# Patient Record
Sex: FEMALE | Race: WHITE | NOT HISPANIC OR LATINO | Employment: FULL TIME
[De-identification: names, ages, dates, MRNs, and addresses within clinical notes are randomized per-mention and may not be internally consistent; named-entity substitution may affect disease eponyms.]

---

## 2021-11-24 ENCOUNTER — OFFICE VISIT (OUTPATIENT)
Dept: MEDICAL GROUP | Facility: OTHER | Age: 56
End: 2021-11-24
Payer: COMMERCIAL

## 2021-11-24 VITALS
DIASTOLIC BLOOD PRESSURE: 75 MMHG | HEART RATE: 77 BPM | WEIGHT: 200 LBS | OXYGEN SATURATION: 98 % | HEIGHT: 65 IN | SYSTOLIC BLOOD PRESSURE: 118 MMHG | BODY MASS INDEX: 33.32 KG/M2 | RESPIRATION RATE: 15 BRPM | TEMPERATURE: 97.2 F

## 2021-11-24 DIAGNOSIS — E03.8 OTHER SPECIFIED HYPOTHYROIDISM: ICD-10-CM

## 2021-11-24 DIAGNOSIS — E66.8 OTHER OBESITY: ICD-10-CM

## 2021-11-24 DIAGNOSIS — F90.9 ATTENTION DEFICIT DISORDER OF ADULT WITH HYPERACTIVITY: ICD-10-CM

## 2021-11-24 PROBLEM — E66.89 OTHER OBESITY: Status: ACTIVE | Noted: 2019-12-11

## 2021-11-24 PROBLEM — Z78.0 MENOPAUSE: Status: ACTIVE | Noted: 2021-11-24

## 2021-11-24 PROBLEM — C73 MALIGNANT TUMOR OF THYROID GLAND (HCC): Status: ACTIVE | Noted: 2021-11-24

## 2021-11-24 PROBLEM — E03.9 HYPOTHYROIDISM: Status: ACTIVE | Noted: 2019-12-11

## 2021-11-24 PROCEDURE — 99214 OFFICE O/P EST MOD 30 MIN: CPT | Performed by: FAMILY MEDICINE

## 2021-11-24 RX ORDER — DEXTROAMPHETAMINE SACCHARATE, AMPHETAMINE ASPARTATE, DEXTROAMPHETAMINE SULFATE AND AMPHETAMINE SULFATE 5; 5; 5; 5 MG/1; MG/1; MG/1; MG/1
20 TABLET ORAL
Qty: 30 EACH | Refills: 0 | Status: SHIPPED | OUTPATIENT
Start: 2022-01-23 | End: 2022-02-22

## 2021-11-24 RX ORDER — DEXTROAMPHETAMINE SACCHARATE, AMPHETAMINE ASPARTATE, DEXTROAMPHETAMINE SULFATE AND AMPHETAMINE SULFATE 2.5; 2.5; 2.5; 2.5 MG/1; MG/1; MG/1; MG/1
10 TABLET ORAL
Qty: 30 TABLET | Refills: 0 | Status: SHIPPED | OUTPATIENT
Start: 2021-12-24 | End: 2021-11-24 | Stop reason: SDUPTHER

## 2021-11-24 RX ORDER — DEXTROAMPHETAMINE SACCHARATE, AMPHETAMINE ASPARTATE, DEXTROAMPHETAMINE SULFATE AND AMPHETAMINE SULFATE 2.5; 2.5; 2.5; 2.5 MG/1; MG/1; MG/1; MG/1
10 TABLET ORAL
Qty: 30 TABLET | Refills: 0 | Status: SHIPPED | OUTPATIENT
Start: 2022-01-23 | End: 2022-02-22

## 2021-11-24 RX ORDER — DEXTROAMPHETAMINE SACCHARATE, AMPHETAMINE ASPARTATE, DEXTROAMPHETAMINE SULFATE AND AMPHETAMINE SULFATE 2.5; 2.5; 2.5; 2.5 MG/1; MG/1; MG/1; MG/1
10 TABLET ORAL
COMMUNITY
End: 2021-11-24 | Stop reason: SDUPTHER

## 2021-11-24 RX ORDER — LIOTHYRONINE SODIUM 5 UG/1
TABLET ORAL
COMMUNITY

## 2021-11-24 RX ORDER — DEXTROAMPHETAMINE SACCHARATE, AMPHETAMINE ASPARTATE, DEXTROAMPHETAMINE SULFATE AND AMPHETAMINE SULFATE 2.5; 2.5; 2.5; 2.5 MG/1; MG/1; MG/1; MG/1
10 TABLET ORAL
Qty: 30 TABLET | Refills: 0 | Status: SHIPPED | OUTPATIENT
Start: 2021-11-24 | End: 2021-11-24 | Stop reason: SDUPTHER

## 2021-11-24 RX ORDER — DEXTROAMPHETAMINE SACCHARATE, AMPHETAMINE ASPARTATE, DEXTROAMPHETAMINE SULFATE AND AMPHETAMINE SULFATE 5; 5; 5; 5 MG/1; MG/1; MG/1; MG/1
20 TABLET ORAL
COMMUNITY
End: 2021-11-24 | Stop reason: SDUPTHER

## 2021-11-24 RX ORDER — DEXTROAMPHETAMINE SACCHARATE, AMPHETAMINE ASPARTATE, DEXTROAMPHETAMINE SULFATE AND AMPHETAMINE SULFATE 5; 5; 5; 5 MG/1; MG/1; MG/1; MG/1
20 TABLET ORAL
Qty: 30 EACH | Refills: 0 | Status: SHIPPED | OUTPATIENT
Start: 2021-11-24 | End: 2021-11-24 | Stop reason: SDUPTHER

## 2021-11-24 RX ORDER — LEVOTHYROXINE SODIUM 0.12 MG/1
TABLET ORAL
COMMUNITY

## 2021-11-24 RX ORDER — DEXTROAMPHETAMINE SACCHARATE, AMPHETAMINE ASPARTATE, DEXTROAMPHETAMINE SULFATE AND AMPHETAMINE SULFATE 5; 5; 5; 5 MG/1; MG/1; MG/1; MG/1
20 TABLET ORAL
Qty: 30 EACH | Refills: 0 | Status: SHIPPED | OUTPATIENT
Start: 2021-12-24 | End: 2021-11-24 | Stop reason: SDUPTHER

## 2021-11-24 ASSESSMENT — ENCOUNTER SYMPTOMS: WEIGHT LOSS: 0

## 2021-11-24 ASSESSMENT — PATIENT HEALTH QUESTIONNAIRE - PHQ9: CLINICAL INTERPRETATION OF PHQ2 SCORE: 0

## 2021-11-24 NOTE — PROGRESS NOTES
Subjective:   Candy Cardona is a 56 y.o. female here for the evaluation and management of Follow-Up (medication)    Attention deficit disorder-stable with current medications, she is adjusting the afternoon dose for better efficacy but otherwise is doing well    Obesity-ongoing efforts at lifestyle changes and weight loss    Hypothyroidism-ongoing management by endocrinology    She reports she has pending referrals for GI and mammography and was encouraged to complete those  Problem   Malignant Tumor of Thyroid Gland (Hcc)   Menopause   Attention Deficit Disorder of Adult With Hyperactivity   Other Obesity   Hypothyroidism       Review of Systems   Constitutional: Negative for weight loss.       Current Outpatient Medications   Medication Sig Dispense Refill   • liothyronine (CYTOMEL) 5 MCG Tab 1 tab(s) am, 0.5 tab pm     • levothyroxine (LEVOXYL) 125 MCG Tab 1 tab 7 days and an extra 1/2 on sunday making it 7.5 tabs a week     • [START ON 1/23/2022] amphetamine-dextroamphetamine (ADDERALL) 10 MG Tab Take 1 Tablet by mouth every day for 30 days. 30 Tablet 0   • [START ON 1/23/2022] amphetamine-dextroamphetamine (ADDERALL) 20 MG Tab Take 1 Tablet by mouth every day for 30 days. 30 Each 0     No current facility-administered medications for this visit.     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   • Umbilical hernia 09/13/2012       Past Surgical History  Past Surgical History:   Procedure Laterality Date   • THYROIDECTOMY TOTAL  6/17/2015     "Procedure: THYROIDECTOMY TOTAL;  Surgeon: Keshav Chahal M.D.;  Location: SURGERY SAME DAY [facility];  Service:    • UMBILICAL HERNIA REPAIR  9/13/2012    Performed by MOO KEENAN at SURGERY SAME DAY [facility]   • GYN SURGERY  2009    uterine ablation   • OTHER  2007    breast augmentation   • GYN SURGERY  2007    tubal        Objective:     Vitals:    11/24/21 0812   BP: 118/75   BP Location: Left arm   Patient Position: Sitting   BP Cuff Size: Adult   Pulse: 77   Resp: 15   Temp: 36.2 °C (97.2 °F)   TempSrc: Temporal   SpO2: 98%   Weight: 90.7 kg (200 lb)   Height: 1.651 m (5' 5\")     Body mass index is 33.28 kg/m².     Physical Exam  Constitutional:       Appearance: Normal appearance.   HENT:      Head: Normocephalic.   Eyes:      Extraocular Movements: Extraocular movements intact.      Conjunctiva/sclera: Conjunctivae normal.   Cardiovascular:      Rate and Rhythm: Normal rate and regular rhythm.      Heart sounds: Normal heart sounds.   Pulmonary:      Effort: Pulmonary effort is normal.      Breath sounds: Normal breath sounds.   Skin:     General: Skin is warm and dry.   Neurological:      Mental Status: She is alert and oriented to person, place, and time. Mental status is at baseline.   Psychiatric:         Mood and Affect: Mood normal.         Behavior: Behavior normal.         Assessment and Plan:   Candy Cardona is a 56 y.o. female with a Follow-Up (medication)     The following was discussed with the patient today.    Problem List Items Addressed This Visit     Attention deficit disorder of adult with hyperactivity    Relevant Medications    amphetamine-dextroamphetamine (ADDERALL) 10 MG Tab (Start on 1/23/2022)    amphetamine-dextroamphetamine (ADDERALL) 20 MG Tab (Start on 1/23/2022)    Other obesity    Relevant Medications    amphetamine-dextroamphetamine (ADDERALL) 10 MG Tab (Start on 1/23/2022)    amphetamine-dextroamphetamine (ADDERALL) 20 MG Tab (Start on 1/23/2022)    " Hypothyroidism    Relevant Medications    liothyronine (CYTOMEL) 5 MCG Tab    levothyroxine (LEVOXYL) 125 MCG Tab        Medications reviewed, refills provided, 3-month supply was provided for Adderall.  Ongoing management of thyroid disease by endocrinology.  She will continue to pursue lifestyle changes for management of obesity.  She reports she is not my help but simply needs to call and schedule with gastroenterology and for her mammogram.  Follow-up in 3 months  Followup: Return in about 3 months (around 2/24/2022).    Frankie Osei M.D.    Please note that this dictation was created using voice recognition software. I have made every reasonable attempt to correct obvious errors, but I expect that there are errors of grammar and possibly content that I did not discover before finalizing the note.

## 2022-02-28 ENCOUNTER — OFFICE VISIT (OUTPATIENT)
Dept: MEDICAL GROUP | Facility: OTHER | Age: 57
End: 2022-02-28
Payer: COMMERCIAL

## 2022-02-28 VITALS
WEIGHT: 195 LBS | HEART RATE: 75 BPM | TEMPERATURE: 97.2 F | SYSTOLIC BLOOD PRESSURE: 115 MMHG | BODY MASS INDEX: 32.49 KG/M2 | RESPIRATION RATE: 14 BRPM | OXYGEN SATURATION: 95 % | DIASTOLIC BLOOD PRESSURE: 70 MMHG | HEIGHT: 65 IN

## 2022-02-28 DIAGNOSIS — E66.8 OTHER OBESITY: ICD-10-CM

## 2022-02-28 DIAGNOSIS — F90.9 ATTENTION DEFICIT DISORDER OF ADULT WITH HYPERACTIVITY: ICD-10-CM

## 2022-02-28 DIAGNOSIS — E03.8 OTHER SPECIFIED HYPOTHYROIDISM: ICD-10-CM

## 2022-02-28 PROCEDURE — 99214 OFFICE O/P EST MOD 30 MIN: CPT | Performed by: FAMILY MEDICINE

## 2022-02-28 RX ORDER — DEXTROAMPHETAMINE SACCHARATE, AMPHETAMINE ASPARTATE, DEXTROAMPHETAMINE SULFATE AND AMPHETAMINE SULFATE 5; 5; 5; 5 MG/1; MG/1; MG/1; MG/1
20 TABLET ORAL DAILY
Qty: 30 TABLET | Refills: 0 | Status: SHIPPED | OUTPATIENT
Start: 2022-03-14 | End: 2022-02-28 | Stop reason: SDUPTHER

## 2022-02-28 RX ORDER — DEXTROAMPHETAMINE SACCHARATE, AMPHETAMINE ASPARTATE, DEXTROAMPHETAMINE SULFATE AND AMPHETAMINE SULFATE 2.5; 2.5; 2.5; 2.5 MG/1; MG/1; MG/1; MG/1
10 TABLET ORAL DAILY
Qty: 30 TABLET | Refills: 0 | Status: SHIPPED | OUTPATIENT
Start: 2022-03-14 | End: 2022-02-28 | Stop reason: SDUPTHER

## 2022-02-28 RX ORDER — DEXTROAMPHETAMINE SACCHARATE, AMPHETAMINE ASPARTATE, DEXTROAMPHETAMINE SULFATE AND AMPHETAMINE SULFATE 5; 5; 5; 5 MG/1; MG/1; MG/1; MG/1
20 TABLET ORAL DAILY
Qty: 30 TABLET | Refills: 0 | Status: SHIPPED | OUTPATIENT
Start: 2022-04-14 | End: 2022-02-28 | Stop reason: SDUPTHER

## 2022-02-28 RX ORDER — DEXTROAMPHETAMINE SACCHARATE, AMPHETAMINE ASPARTATE, DEXTROAMPHETAMINE SULFATE AND AMPHETAMINE SULFATE 2.5; 2.5; 2.5; 2.5 MG/1; MG/1; MG/1; MG/1
10 TABLET ORAL DAILY
Qty: 30 TABLET | Refills: 0 | Status: SHIPPED | OUTPATIENT
Start: 2022-05-14 | End: 2022-06-14 | Stop reason: SDUPTHER

## 2022-02-28 RX ORDER — DEXTROAMPHETAMINE SACCHARATE, AMPHETAMINE ASPARTATE, DEXTROAMPHETAMINE SULFATE AND AMPHETAMINE SULFATE 2.5; 2.5; 2.5; 2.5 MG/1; MG/1; MG/1; MG/1
10 TABLET ORAL DAILY
Qty: 30 TABLET | Refills: 0 | Status: SHIPPED | OUTPATIENT
Start: 2022-04-14 | End: 2022-02-28 | Stop reason: SDUPTHER

## 2022-02-28 RX ORDER — DEXTROAMPHETAMINE SACCHARATE, AMPHETAMINE ASPARTATE, DEXTROAMPHETAMINE SULFATE AND AMPHETAMINE SULFATE 5; 5; 5; 5 MG/1; MG/1; MG/1; MG/1
20 TABLET ORAL DAILY
Qty: 30 TABLET | Refills: 0 | Status: SHIPPED | OUTPATIENT
Start: 2022-05-14 | End: 2022-06-14 | Stop reason: SDUPTHER

## 2022-02-28 ASSESSMENT — ENCOUNTER SYMPTOMS: CONSTITUTIONAL NEGATIVE: 1

## 2022-02-28 NOTE — PROGRESS NOTES
Subjective:   Candy Cardona is a 56 y.o. female here for the evaluation and management of Medication Refill    Attention deficit disorder-generally stable and well controlled on current medication, over the last 6 weeks he is noted some challenges with focus but thinks it may be secondary to personal stressors and potentially her thyroid disorder    Hypothyroidism-stable, follow-up with endocrinology planned this week    Obesity-discussed therapeutic lifestyle changes and she is adjusting her diet and feels like she is making progress  No problems updated.    Review of Systems   Constitutional: Negative.        Current Outpatient Medications   Medication Sig Dispense Refill   • [START ON 5/14/2022] amphetamine-dextroamphetamine (ADDERALL) 10 MG Tab Take 1 Tablet by mouth every day for 30 days. 30 Tablet 0   • [START ON 5/14/2022] amphetamine-dextroamphetamine (ADDERALL) 20 MG Tab Take 1 Tablet by mouth every day for 30 days. 30 Tablet 0   • liothyronine (CYTOMEL) 5 MCG Tab 1 tab(s) am, 0.5 tab pm     • levothyroxine (SYNTHROID) 125 MCG Tab 1 tab 7 days and an extra 1/2 on sunday making it 7.5 tabs a week       No current facility-administered medications for this visit.     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S   • GYN SURGERY  2009    uterine ablation   • OTHER  2007    breast augmentation   • GYN SURGERY  2007    tubal        Objective:     Vitals:    02/28/22 0811   BP: 115/70   BP Location: Left arm   Patient Position: Sitting   BP Cuff Size: Adult   Pulse: 75   Resp: 14   Temp: 36.2 °C (97.2 °F)   TempSrc: Temporal   SpO2: 95%   Weight: 88.5 kg (195 lb)   Height: 1.651 m (5' 5\")     Body mass index is 32.45 kg/m².     Physical Exam  Constitutional:       Appearance: Normal appearance.   HENT:      Head: Normocephalic.   Eyes:      Extraocular Movements: Extraocular movements intact.      Conjunctiva/sclera: Conjunctivae normal.   Cardiovascular:      Rate and Rhythm: Normal rate and regular rhythm.      Heart sounds: Normal heart sounds.   Pulmonary:      Effort: Pulmonary effort is normal.      Breath sounds: Normal breath sounds.   Musculoskeletal:      Cervical back: Neck supple.   Skin:     General: Skin is warm and dry.   Neurological:      Mental Status: She is alert and oriented to person, place, and time. Mental status is at baseline.   Psychiatric:         Mood and Affect: Mood normal.         Behavior: Behavior normal.         Assessment and Plan:   Candy Cardona is a 56 y.o. female with a Medication Refill     The following was discussed with the patient today.    Problem List Items Addressed This Visit     Attention deficit disorder of adult with hyperactivity    Relevant Medications    amphetamine-dextroamphetamine (ADDERALL) 10 MG Tab (Start on 5/14/2022)    amphetamine-dextroamphetamine (ADDERALL) 20 MG Tab (Start on 5/14/2022)    Other Relevant Orders    Controlled Substance Treatment Agreement    Other obesity    Relevant Medications    amphetamine-dextroamphetamine (ADDERALL) 10 MG Tab (Start on 5/14/2022)    amphetamine-dextroamphetamine " (ADDERALL) 20 MG Tab (Start on 5/14/2022)    Hypothyroidism        Medications reviewed and refills provided, 3-month supply provided on her Adderall, follow-up confirmed with endocrinology, patient is scheduled for colonoscopy for March 16, followed up on mammogram and she says she will schedule this, immunizations reviewed and she reports a plan to obtain her second shingles vaccination, follow-up with me in 3 months, prescription monitoring report reviewed and informed written consent obtained  Followup: Return in about 3 months (around 5/28/2022).    Frankie Osei M.D.    Please note that this dictation was created using voice recognition software. I have made every reasonable attempt to correct obvious errors, but I expect that there are errors of grammar and possibly content that I did not discover before finalizing the note.

## 2022-06-14 ENCOUNTER — OFFICE VISIT (OUTPATIENT)
Dept: MEDICAL GROUP | Facility: OTHER | Age: 57
End: 2022-06-14
Payer: COMMERCIAL

## 2022-06-14 VITALS
HEIGHT: 65 IN | WEIGHT: 199 LBS | BODY MASS INDEX: 33.15 KG/M2 | SYSTOLIC BLOOD PRESSURE: 114 MMHG | TEMPERATURE: 97 F | OXYGEN SATURATION: 94 % | RESPIRATION RATE: 16 BRPM | DIASTOLIC BLOOD PRESSURE: 80 MMHG | HEART RATE: 83 BPM

## 2022-06-14 DIAGNOSIS — E66.8 OTHER OBESITY: ICD-10-CM

## 2022-06-14 DIAGNOSIS — R06.83 SNORING: ICD-10-CM

## 2022-06-14 DIAGNOSIS — Z00.00 EXAMINATION, MEDICAL, GENERAL: ICD-10-CM

## 2022-06-14 DIAGNOSIS — E03.8 OTHER SPECIFIED HYPOTHYROIDISM: ICD-10-CM

## 2022-06-14 DIAGNOSIS — F90.9 ATTENTION DEFICIT DISORDER OF ADULT WITH HYPERACTIVITY: ICD-10-CM

## 2022-06-14 PROCEDURE — 99214 OFFICE O/P EST MOD 30 MIN: CPT | Performed by: FAMILY MEDICINE

## 2022-06-14 RX ORDER — DEXTROAMPHETAMINE SACCHARATE, AMPHETAMINE ASPARTATE, DEXTROAMPHETAMINE SULFATE AND AMPHETAMINE SULFATE 5; 5; 5; 5 MG/1; MG/1; MG/1; MG/1
20 TABLET ORAL DAILY
Qty: 90 TABLET | Refills: 0 | Status: SHIPPED | OUTPATIENT
Start: 2022-06-14 | End: 2022-09-12

## 2022-06-14 RX ORDER — DEXTROAMPHETAMINE SACCHARATE, AMPHETAMINE ASPARTATE, DEXTROAMPHETAMINE SULFATE AND AMPHETAMINE SULFATE 2.5; 2.5; 2.5; 2.5 MG/1; MG/1; MG/1; MG/1
10 TABLET ORAL DAILY
Qty: 90 TABLET | Refills: 0 | Status: SHIPPED | OUTPATIENT
Start: 2022-06-14 | End: 2022-09-12

## 2022-06-14 ASSESSMENT — PATIENT HEALTH QUESTIONNAIRE - PHQ9: CLINICAL INTERPRETATION OF PHQ2 SCORE: 0

## 2022-06-14 ASSESSMENT — ENCOUNTER SYMPTOMS: CONSTITUTIONAL NEGATIVE: 1

## 2022-06-14 NOTE — PROGRESS NOTES
Subjective:   Candy Cardona is a 56 y.o. 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  Uncertain if she is a heavy snorer as she does not currently have a sleep partner who can report.  No problems updated.    Review of Systems   Constitutional: Negative.        Current Outpatient Medications   Medication Sig Dispense Refill   • amphetamine-dextroamphetamine (ADDERALL) 10 MG Tab Take 1 Tablet by mouth every day for 90 days. 90 Tablet 0   • amphetamine-dextroamphetamine (ADDERALL) 20 MG Tab Take 1 Tablet by mouth every day for 90 days. 90 Tablet 0   • liothyronine (CYTOMEL) 5 MCG Tab 1 tab(s) am, 0.5 tab pm     • levothyroxine (SYNTHROID) 125 MCG Tab 1 tab 7 days and an extra 1/2 on sunday making it 7.5 tabs a week       No current facility-administered medications for this visit.     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   • Umbilical hernia 09/13/2012       Past Surgical History  Past Surgical History:   Procedure Laterality Date   •  "THYROIDECTOMY TOTAL  6/17/2015    Procedure: THYROIDECTOMY TOTAL;  Surgeon: Keshav Chahal M.D.;  Location: SURGERY SAME DAY [facility];  Service:    • UMBILICAL HERNIA REPAIR  9/13/2012    Performed by MOO KEENAN at SURGERY SAME DAY [facility] ORS   • GYN SURGERY  2009    uterine ablation   • OTHER  2007    breast augmentation   • GYN SURGERY  2007    tubal        Objective:     Vitals:    06/14/22 0803   BP: 114/80   BP Location: Right arm   Patient Position: Sitting   BP Cuff Size: Adult   Pulse: 83   Resp: 16   Temp: 36.1 °C (97 °F)   TempSrc: Temporal   SpO2: 94%   Weight: 90.3 kg (199 lb)   Height: 1.651 m (5' 5\")     Body mass index is 33.12 kg/m².     Physical Exam  Constitutional:       Appearance: Normal appearance.   HENT:      Head: Normocephalic.   Eyes:      Extraocular Movements: Extraocular movements intact.      Conjunctiva/sclera: Conjunctivae normal.   Cardiovascular:      Rate and Rhythm: Normal rate and regular rhythm.      Heart sounds: Normal heart sounds.   Pulmonary:      Effort: Pulmonary effort is normal.      Breath sounds: Normal breath sounds.   Skin:     General: Skin is warm and dry.   Neurological:      Mental Status: She is alert and oriented to person, place, and time. Mental status is at baseline.   Psychiatric:         Mood and Affect: Mood normal.         Behavior: Behavior normal.         Assessment and Plan:   Candy Cardona is a 56 y.o. female with a Medication Refill     The following was discussed with the patient today.    Problem List Items Addressed This Visit     Attention deficit disorder of adult with hyperactivity    Relevant Medications    amphetamine-dextroamphetamine (ADDERALL) 10 MG Tab    amphetamine-dextroamphetamine (ADDERALL) 20 MG Tab    Other Relevant Orders    CBC WITHOUT DIFFERENTIAL    HEMOGLOBIN A1C    Comp Metabolic Panel    Lipid Profile    Other obesity    Relevant Medications    amphetamine-dextroamphetamine (ADDERALL) 10 MG Tab    " amphetamine-dextroamphetamine (ADDERALL) 20 MG Tab    Other Relevant Orders    CBC WITHOUT DIFFERENTIAL    HEMOGLOBIN A1C    Comp Metabolic Panel    Lipid Profile    Hypothyroidism    Relevant Orders    CBC WITHOUT DIFFERENTIAL    HEMOGLOBIN A1C    Comp Metabolic Panel    Lipid Profile      Other Visit Diagnoses     Examination, medical, general        Relevant Orders    CBC WITHOUT DIFFERENTIAL    Comp Metabolic Panel    Lipid Profile        Medications reviewed and refills provided, reviewed prescription monitoring report and provided a 90-day supply for Adderall, she has been stable with long-term use of Adderall.  Routine laboratory studies recommended, referral for home sleep study, close follow-up in 3 months  Followup: No follow-ups on file.    Frankie Osei M.D.    Please note that this dictation was created using voice recognition software. I have made every reasonable attempt to correct obvious errors, but I expect that there are errors of grammar and possibly content that I did not discover before finalizing the note.

## 2022-06-17 DIAGNOSIS — Z00.00 EXAMINATION, MEDICAL, GENERAL: ICD-10-CM

## 2022-06-21 ENCOUNTER — TELEPHONE (OUTPATIENT)
Dept: MEDICAL GROUP | Facility: OTHER | Age: 57
End: 2022-06-21
Payer: COMMERCIAL

## 2022-06-21 DIAGNOSIS — Z00.00 EXAMINATION, MEDICAL, GENERAL: ICD-10-CM

## 2022-06-21 NOTE — TELEPHONE ENCOUNTER
Dr. Osei when you get a chance would you please add the follow labs to patient order for her upcoming blood draw.   A varicella titer, Hep b titer, tb titer. Please advise

## 2022-06-21 NOTE — TELEPHONE ENCOUNTER
Pt called she has a lab slip to get labs done. She needs  A varicella titer, Hep b titer, tb titer. She is having her labs done tomorrow. Please call pt if there are any questions [phone] thank you

## 2022-09-13 ENCOUNTER — OFFICE VISIT (OUTPATIENT)
Dept: MEDICAL GROUP | Facility: CLINIC | Age: 57
End: 2022-09-13
Payer: COMMERCIAL

## 2022-09-13 VITALS
HEIGHT: 65 IN | DIASTOLIC BLOOD PRESSURE: 80 MMHG | WEIGHT: 202 LBS | HEART RATE: 95 BPM | OXYGEN SATURATION: 95 % | SYSTOLIC BLOOD PRESSURE: 132 MMHG | BODY MASS INDEX: 33.66 KG/M2 | RESPIRATION RATE: 16 BRPM

## 2022-09-13 DIAGNOSIS — R06.83 SNORING: ICD-10-CM

## 2022-09-13 DIAGNOSIS — F90.9 ATTENTION DEFICIT DISORDER OF ADULT WITH HYPERACTIVITY: ICD-10-CM

## 2022-09-13 DIAGNOSIS — G47.33 OSA (OBSTRUCTIVE SLEEP APNEA): ICD-10-CM

## 2022-09-13 PROCEDURE — 99214 OFFICE O/P EST MOD 30 MIN: CPT | Mod: GC | Performed by: STUDENT IN AN ORGANIZED HEALTH CARE EDUCATION/TRAINING PROGRAM

## 2022-09-13 RX ORDER — DEXTROAMPHETAMINE SACCHARATE, AMPHETAMINE ASPARTATE, DEXTROAMPHETAMINE SULFATE AND AMPHETAMINE SULFATE 5; 5; 5; 5 MG/1; MG/1; MG/1; MG/1
20 TABLET ORAL 2 TIMES DAILY
Qty: 60 EACH | Refills: 0 | Status: SHIPPED | OUTPATIENT
Start: 2022-09-13 | End: 2022-10-13

## 2022-09-13 RX ORDER — DEXTROAMPHETAMINE SACCHARATE, AMPHETAMINE ASPARTATE, DEXTROAMPHETAMINE SULFATE AND AMPHETAMINE SULFATE 5; 5; 5; 5 MG/1; MG/1; MG/1; MG/1
20 TABLET ORAL 2 TIMES DAILY
Qty: 60 EACH | Refills: 0 | Status: SHIPPED | OUTPATIENT
Start: 2022-11-08 | End: 2022-12-08

## 2022-09-13 RX ORDER — DEXTROAMPHETAMINE SACCHARATE, AMPHETAMINE ASPARTATE, DEXTROAMPHETAMINE SULFATE AND AMPHETAMINE SULFATE 5; 5; 5; 5 MG/1; MG/1; MG/1; MG/1
20 TABLET ORAL DAILY
COMMUNITY
End: 2022-09-13

## 2022-09-13 RX ORDER — DEXTROAMPHETAMINE SACCHARATE, AMPHETAMINE ASPARTATE, DEXTROAMPHETAMINE SULFATE AND AMPHETAMINE SULFATE 2.5; 2.5; 2.5; 2.5 MG/1; MG/1; MG/1; MG/1
10 TABLET ORAL DAILY
COMMUNITY
End: 2022-09-13

## 2022-09-13 RX ORDER — DEXTROAMPHETAMINE SACCHARATE, AMPHETAMINE ASPARTATE, DEXTROAMPHETAMINE SULFATE AND AMPHETAMINE SULFATE 5; 5; 5; 5 MG/1; MG/1; MG/1; MG/1
20 TABLET ORAL 2 TIMES DAILY
Qty: 60 EACH | Refills: 0 | Status: SHIPPED | OUTPATIENT
Start: 2022-10-11 | End: 2022-11-10

## 2022-09-13 ASSESSMENT — FIBROSIS 4 INDEX: FIB4 SCORE: 0.73

## 2022-09-13 NOTE — PROGRESS NOTES
"Subjective:     CC: Follow up    HPI:   Candy presents today with     Problem   Snoring    As well as daytime somnolence.  Patient's mother suffers from sleep apnea, both obstructive and central.  Patient also snores.  She is concerned that she has sleep apnea.  She went to a specialist already, but had a very negative experience with this provider and is requesting a referral to another specific provider.     Attention Deficit Disorder of Adult With Hyperactivity    Patient here for refill on her Adderall.  She is currently only taking 20 mg at 6 AM and then 10 mg at 1:00 PM.  She states she is having no difficulty sleeping, no symptoms of agitation or irritability, no palpitations.  She is tolerating this regimen well, but did discuss with Dr. Osei historically about increasing the afternoon dose for better productivity.  She states that the afternoon her attention wanes and she could use some extra help there.  She does not take medication holidays at this time.          Current Outpatient Medications Ordered in Epic   Medication Sig Dispense Refill    amphetamine-dextroamphetamine (ADDERALL) 20 MG Tab Take 1 Tablet by mouth 2 times a day for 30 days. 20 mg AM and 20 mg PM 60 Each 0    [START ON 10/11/2022] amphetamine-dextroamphetamine (ADDERALL) 20 MG Tab Take 1 Tablet by mouth 2 times a day for 30 days. 60 Each 0    [START ON 11/8/2022] amphetamine-dextroamphetamine (ADDERALL) 20 MG Tab Take 1 Tablet by mouth 2 times a day for 30 days. 60 Each 0    liothyronine (CYTOMEL) 5 MCG Tab 1 tab(s) am, 0.5 tab pm      levothyroxine (SYNTHROID) 125 MCG Tab 1 tab 7 days and an extra 1/2 on sunday making it 7.5 tabs a week       No current Epic-ordered facility-administered medications on file.         Objective:     Exam:  /80 (BP Location: Right arm, Patient Position: Sitting, BP Cuff Size: Adult)   Pulse 95   Resp 16   Ht 1.651 m (5' 5\")   Wt 91.6 kg (202 lb)   SpO2 95%   BMI 33.61 kg/m²  Body mass index is " 33.61 kg/m².    General: Normal appearing. No distress.  Neck: Supple without JVD or bruit. Thyroid is not enlarged.  Pulmonary: Clear to ausculation.  Normal effort. No rales, ronchi, or wheezing.  Cardiovascular: Regular rate and rhythm without murmur.   Abdomen: Soft, nontender, nondistended. Normal bowel sounds. Liver and spleen are not palpable  Neurologic: Grossly nonfocal  Lymph: No cervical or supraclavicular lymph nodes are palpable  Skin: Warm and dry.  No obvious lesions.  Musculoskeletal: Normal gait. No extremity cyanosis, clubbing, or edema.  Psych: Normal mood and affect. Alert and oriented x3. Judgment and insight is normal.      Assessment & Plan:     56 y.o. female with the following -     Problem List Items Addressed This Visit       Attention deficit disorder of adult with hyperactivity     Will increase the dose of regular Adderall to the following regimen: 20 mg in the morning and 20 mg in the afternoon.  Discussed with patient that the half-life is 4 to 6 hours, so the increased dose in the afternoon should not affect her sleep.  Patient is tolerating the medication well overall.  There are no signs of abuse.   reviewed and no concerns.  Controlled substance agreement on file from this year.  Follow-up with me in 3 months.         Relevant Medications    amphetamine-dextroamphetamine (ADDERALL) 20 MG Tab    amphetamine-dextroamphetamine (ADDERALL) 20 MG Tab (Start on 10/11/2022)    amphetamine-dextroamphetamine (ADDERALL) 20 MG Tab (Start on 11/8/2022)    Snoring     Suspicion for CHUCKY is high with a STOP-BANG score of 4.  Provided referral to another specialist to perform sleep study.          Other Visit Diagnoses       CHUCKY (obstructive sleep apnea)        Relevant Orders    Referral to Pulmonary and Sleep Medicine              No follow-ups on file.    Love Hastings MD   PGY-3

## 2022-09-14 NOTE — ASSESSMENT & PLAN NOTE
Suspicion for CHUCKY is high with a STOP-BANG score of 4.  Provided referral to another specialist to perform sleep study.

## 2022-09-14 NOTE — ASSESSMENT & PLAN NOTE
Will increase the dose of regular Adderall to the following regimen: 20 mg in the morning and 20 mg in the afternoon.  Discussed with patient that the half-life is 4 to 6 hours, so the increased dose in the afternoon should not affect her sleep.  Patient is tolerating the medication well overall.  There are no signs of abuse.   reviewed and no concerns.  Controlled substance agreement on file from this year.  Follow-up with me in 3 months.

## 2022-12-06 ENCOUNTER — OFFICE VISIT (OUTPATIENT)
Dept: MEDICAL GROUP | Facility: CLINIC | Age: 57
End: 2022-12-06
Payer: COMMERCIAL

## 2022-12-06 VITALS
HEART RATE: 84 BPM | OXYGEN SATURATION: 98 % | BODY MASS INDEX: 35.74 KG/M2 | TEMPERATURE: 97.8 F | HEIGHT: 65 IN | DIASTOLIC BLOOD PRESSURE: 78 MMHG | SYSTOLIC BLOOD PRESSURE: 118 MMHG | WEIGHT: 214.5 LBS

## 2022-12-06 DIAGNOSIS — Z23 NEED FOR VACCINATION: ICD-10-CM

## 2022-12-06 DIAGNOSIS — G47.33 OSA (OBSTRUCTIVE SLEEP APNEA): ICD-10-CM

## 2022-12-06 DIAGNOSIS — E66.8 OTHER OBESITY: ICD-10-CM

## 2022-12-06 DIAGNOSIS — F90.9 ATTENTION DEFICIT DISORDER OF ADULT WITH HYPERACTIVITY: ICD-10-CM

## 2022-12-06 DIAGNOSIS — Z12.31 ENCOUNTER FOR SCREENING MAMMOGRAM FOR MALIGNANT NEOPLASM OF BREAST: ICD-10-CM

## 2022-12-06 PROCEDURE — 90686 IIV4 VACC NO PRSV 0.5 ML IM: CPT | Performed by: STUDENT IN AN ORGANIZED HEALTH CARE EDUCATION/TRAINING PROGRAM

## 2022-12-06 PROCEDURE — 90471 IMMUNIZATION ADMIN: CPT | Performed by: STUDENT IN AN ORGANIZED HEALTH CARE EDUCATION/TRAINING PROGRAM

## 2022-12-06 PROCEDURE — 99213 OFFICE O/P EST LOW 20 MIN: CPT | Mod: 25,GC | Performed by: STUDENT IN AN ORGANIZED HEALTH CARE EDUCATION/TRAINING PROGRAM

## 2022-12-06 ASSESSMENT — FIBROSIS 4 INDEX: FIB4 SCORE: 0.74

## 2022-12-06 NOTE — ASSESSMENT & PLAN NOTE
Tolerating current medication regimen well with no side effects.  She is on 20 mg in the morning and 20 mg at noon.  Patient is taking several days off of the medication as needed.  Follow-up in January at which point we will renew the controlled substance agreement and refill her medication for 3 months at a time.

## 2022-12-06 NOTE — PROGRESS NOTES
"Subjective:     CC: Follow up    HPI:   Candy presents today to discuss:     Problem   Kurtis (Obstructive Sleep Apnea)   Attention Deficit Disorder of Adult With Hyperactivity    Patient is doing well with her current regimen: 20 mg at 6 AM and 20 mg at noon.  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  Patient is not in need of any refills at this time because she has been taking a break from the medication.  She will need a refill in January at which point she has an appointment scheduled.  Patient denies any palpitations, change in mood, aggression, insomnia, anorexia.     Other Obesity    Patient currently working with a nutritionist and exercise , established with them 2 to 3 months ago, going well.  Because which asked the patient to inquire with her PCP about hormone replacement therapy.  Patient thinks that she reached menopause about 7 to 8 years ago according to her blood work.  She had a uterine ablation several years ago, so she is unable to pinpoint when exactly she reached menopause.  She does not suffer from hot flashes nearly as much as she used to.         Current Outpatient Medications Ordered in Epic   Medication Sig Dispense Refill    amphetamine-dextroamphetamine (ADDERALL) 20 MG Tab Take 1 Tablet by mouth 2 times a day for 30 days. 60 Each 0    liothyronine (CYTOMEL) 5 MCG Tab 1 tab(s) am, 0.5 tab pm      levothyroxine (SYNTHROID) 125 MCG Tab 1 tab 7 days and an extra 1/2 on sunday making it 7.5 tabs a week       No current Epic-ordered facility-administered medications on file.       Objective:     Exam:  /78 (BP Location: Right arm, Patient Position: Sitting, BP Cuff Size: Adult)   Pulse 84   Temp 36.6 °C (97.8 °F) (Temporal)   Ht 1.651 m (5' 5\")   Wt 97.3 kg (214 lb 8 oz)   SpO2 98%   BMI 35.69 kg/m²  Body mass index is 35.69 kg/m².    General: Normal appearing. No " distress.  Pulmonary: Clear to ausculation.  Normal effort. No rales, ronchi, or wheezing.  Cardiovascular: Regular rate and rhythm without murmur.   Neurologic: Grossly nonfocal  Lymph: No cervical or supraclavicular lymph nodes are palpable  Skin: Warm and dry.  No obvious lesions.  Musculoskeletal: Normal gait. No extremity cyanosis, clubbing, or edema.  Psych: Normal mood and affect. Alert and oriented x3. Judgment and insight is normal.        Assessment & Plan:     57 y.o. female with the following -     Problem List Items Addressed This Visit       Attention deficit disorder of adult with hyperactivity     Tolerating current medication regimen well with no side effects.  She is on 20 mg in the morning and 20 mg at noon.  Patient is taking several days off of the medication as needed.  Follow-up in January at which point we will renew the controlled substance agreement and refill her medication for 3 months at a time.         Other obesity     Continue working with nutritionist and exercise .  Patient's BMI is currently 35.7.  No need for hormone replacement therapy based upon symptoms alone at this time, risks outweigh benefits in this patient's case.         CHUCKY (obstructive sleep apnea)     Thought to be moderate sleep apnea according to a home study recently.  She has an appointment with the sleep medicine doctor in the next week or 2 to get set up with CPAP.  Hopefully this helps with her daytime fatigue and headaches.  Her blood pressure is well controlled.  This could be contributing to her obesity.          Other Visit Diagnoses       Need for vaccination        Encounter for screening mammogram for malignant neoplasm of breast        Relevant Orders    MA-SCREENING MAMMO BILAT W/TOMOSYNTHESIS W/CAD              No follow-ups on file.    Love Hastings MD   PGY-3

## 2022-12-06 NOTE — ASSESSMENT & PLAN NOTE
Thought to be moderate sleep apnea according to a home study recently.  She has an appointment with the sleep medicine doctor in the next week or 2 to get set up with CPAP.  Hopefully this helps with her daytime fatigue and headaches.  Her blood pressure is well controlled.  This could be contributing to her obesity.

## 2022-12-06 NOTE — ASSESSMENT & PLAN NOTE
Continue working with nutritionist and exercise .  Patient's BMI is currently 35.7.  No need for hormone replacement therapy based upon symptoms alone at this time, risks outweigh benefits in this patient's case.

## 2023-01-10 ENCOUNTER — APPOINTMENT (OUTPATIENT)
Dept: MEDICAL GROUP | Facility: CLINIC | Age: 58
End: 2023-01-10
Payer: COMMERCIAL

## 2023-01-10 ENCOUNTER — OFFICE VISIT (OUTPATIENT)
Dept: MEDICAL GROUP | Facility: CLINIC | Age: 58
End: 2023-01-10
Payer: COMMERCIAL

## 2023-01-10 DIAGNOSIS — F90.9 ATTENTION DEFICIT DISORDER OF ADULT WITH HYPERACTIVITY: ICD-10-CM

## 2023-01-10 DIAGNOSIS — G47.33 OSA (OBSTRUCTIVE SLEEP APNEA): ICD-10-CM

## 2023-01-10 DIAGNOSIS — E03.8 OTHER SPECIFIED HYPOTHYROIDISM: ICD-10-CM

## 2023-01-10 PROCEDURE — 99213 OFFICE O/P EST LOW 20 MIN: CPT | Mod: GE | Performed by: STUDENT IN AN ORGANIZED HEALTH CARE EDUCATION/TRAINING PROGRAM

## 2023-01-10 RX ORDER — DEXTROAMPHETAMINE SACCHARATE, AMPHETAMINE ASPARTATE, DEXTROAMPHETAMINE SULFATE AND AMPHETAMINE SULFATE 5; 5; 5; 5 MG/1; MG/1; MG/1; MG/1
20 TABLET ORAL 2 TIMES DAILY
Qty: 60 TABLET | Refills: 0 | Status: SHIPPED | OUTPATIENT
Start: 2023-03-07 | End: 2023-04-06

## 2023-01-10 RX ORDER — DEXTROAMPHETAMINE SACCHARATE, AMPHETAMINE ASPARTATE, DEXTROAMPHETAMINE SULFATE AND AMPHETAMINE SULFATE 5; 5; 5; 5 MG/1; MG/1; MG/1; MG/1
20 TABLET ORAL 2 TIMES DAILY
Qty: 60 TABLET | Refills: 0 | Status: SHIPPED | OUTPATIENT
Start: 2023-02-07 | End: 2023-03-09

## 2023-01-10 RX ORDER — DEXTROAMPHETAMINE SACCHARATE, AMPHETAMINE ASPARTATE, DEXTROAMPHETAMINE SULFATE AND AMPHETAMINE SULFATE 5; 5; 5; 5 MG/1; MG/1; MG/1; MG/1
20 TABLET ORAL 2 TIMES DAILY
Qty: 60 TABLET | Refills: 0 | Status: SHIPPED | OUTPATIENT
Start: 2023-01-10 | End: 2023-02-09

## 2023-01-10 ASSESSMENT — FIBROSIS 4 INDEX: FIB4 SCORE: 0.74

## 2023-01-10 NOTE — ASSESSMENT & PLAN NOTE
Patient here for medication refill for her ADHD.  She is doing well with the current dose of 20 mg twice daily, used judiciously and also several hours before bedtime so as to avoid insomnia.  Controlled substance agreement filled out today and scanned into patient's chart.   reviewed, no concerns.  No concerns for abuse in general, no side effects, patient tolerating the regimen well.  Follow-up in 3 months Zoom visit.

## 2023-01-10 NOTE — ASSESSMENT & PLAN NOTE
Moderate CHUCKY, managed by sleep medicine doctor. Patient's symptoms of fatigue and AM headaches much improved with CPAP.

## 2023-01-10 NOTE — PROGRESS NOTES
"Subjective:     CC: Follow up meds    HPI:   Candy presents today to discuss:    Problem   Kurtis (Obstructive Sleep Apnea)    On CPAP now as arranged by sleep medicine doctor. Patient reports dramatic improvement in sleep, morning headaches, and general fatigue. She is happy with this.        Attention Deficit Disorder of Adult With Hyperactivity    Patient is doing well with her current regimen: 20 mg at 6 AM and 20 mg at noon.  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    Patient denies any palpitations, change in mood, aggression, insomnia, anorexia.     Hypothyroidism       Current Outpatient Medications Ordered in Epic   Medication Sig Dispense Refill    amphetamine-dextroamphetamine (ADDERALL) 20 MG Tab Take 1 Tablet by mouth 2 times a day for 30 days. 60 Tablet 0    [START ON 2/7/2023] amphetamine-dextroamphetamine (ADDERALL) 20 MG Tab Take 1 Tablet by mouth 2 times a day for 30 days. 60 Tablet 0    [START ON 3/7/2023] amphetamine-dextroamphetamine (ADDERALL) 20 MG Tab Take 1 Tablet by mouth 2 times a day for 30 days. 60 Tablet 0    liothyronine (CYTOMEL) 5 MCG Tab 1 tab(s) am, 0.5 tab pm      levothyroxine (SYNTHROID) 125 MCG Tab 1 tab 7 days and an extra 1/2 on sunday making it 7.5 tabs a week       No current Epic-ordered facility-administered medications on file.       Objective:     Exam:  BP (P) 128/80 (BP Location: Left arm, Patient Position: Sitting, BP Cuff Size: Adult)   Pulse (P) 80   Temp (P) 37 °C (98.6 °F) (Temporal)   Ht (P) 1.651 m (5' 5\")   Wt (P) 98.7 kg (217 lb 8 oz)   SpO2 (P) 96%   BMI (P) 36.19 kg/m²  Body mass index is 36.19 kg/m² (pended).    General: Normal appearing. No distress.  Neck: Supple without JVD or bruit. Thyroid is not enlarged.  Pulmonary: Clear to ausculation.  Normal effort. No rales, ronchi, or wheezing.  Cardiovascular: Regular rate and rhythm without murmur.   Neurologic: Grossly nonfocal  Lymph: No cervical or " supraclavicular lymph nodes are palpable  Skin: Warm and dry.  No obvious lesions.  Musculoskeletal: Normal gait. No extremity cyanosis, clubbing, or edema.  Psych: Normal mood and affect. Alert and oriented x3. Judgment and insight is normal.      Assessment & Plan:     57 y.o. female with the following -     Problem List Items Addressed This Visit       Attention deficit disorder of adult with hyperactivity     Patient here for medication refill for her ADHD.  She is doing well with the current dose of 20 mg twice daily, used judiciously and also several hours before bedtime so as to avoid insomnia.  Controlled substance agreement filled out today and scanned into patient's chart.   reviewed, no concerns.  No concerns for abuse in general, no side effects, patient tolerating the regimen well.  Follow-up in 3 months Zoom visit.         Relevant Medications    amphetamine-dextroamphetamine (ADDERALL) 20 MG Tab    amphetamine-dextroamphetamine (ADDERALL) 20 MG Tab (Start on 2/7/2023)    amphetamine-dextroamphetamine (ADDERALL) 20 MG Tab (Start on 3/7/2023)    Other Relevant Orders    Controlled Substance Treatment Agreement    Hypothyroidism     Dr. Goodman endocrinologist manages this. No change in medication lately.         CHUCKY (obstructive sleep apnea)     Moderate CHUCKY, managed by sleep medicine doctor. Patient's symptoms of fatigue and AM headaches much improved with CPAP.               No follow-ups on file.    Love Hastings MD   PGY-3

## 2023-04-11 ENCOUNTER — TELEMEDICINE (OUTPATIENT)
Dept: MEDICAL GROUP | Facility: CLINIC | Age: 58
End: 2023-04-11
Payer: COMMERCIAL

## 2023-04-11 DIAGNOSIS — F90.9 ATTENTION DEFICIT DISORDER OF ADULT WITH HYPERACTIVITY: ICD-10-CM

## 2023-04-11 PROCEDURE — 99213 OFFICE O/P EST LOW 20 MIN: CPT | Mod: GE,95 | Performed by: STUDENT IN AN ORGANIZED HEALTH CARE EDUCATION/TRAINING PROGRAM

## 2023-04-11 NOTE — PROGRESS NOTES
Virtual Visit: Established Patient   This visit was conducted via Zoom using secure and encrypted videoconferencing technology.   The patient was in their home in the [facility].    The patient's identity was confirmed and verbal consent was obtained for this virtual visit.    Subjective:   CC:   Chief Complaint   Patient presents with    Medication Refill     Candy Cardona is a 57 y.o. female presenting for evaluation and management of:    # ADHD  Patient presents via Zoom for her follow-up on medication management for ADHD.  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  She is enjoying the didactics portion and performing very well scholastically.  Patient states she is taking several medication vacations on days when she is not working or in school.  On those days, she takes 0 to 1 tablets and notices a sensation of slight fatigue, but she expects this. Overall, she is tolerating her medication very well. Denies insomnia, palpitations, or other adverse effects.     Current medicines (including changes today)  Current Outpatient Medications   Medication Sig Dispense Refill    liothyronine (CYTOMEL) 5 MCG Tab 1 tab(s) am, 0.5 tab pm      levothyroxine (SYNTHROID) 125 MCG Tab 1 tab 7 days and an extra 1/2 on sunday making it 7.5 tabs a week       No current facility-administered medications for this visit.       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    Physical Exam:  Constitutional: Alert, no distress, well-groomed.  Skin: No rashes in visible areas.  Eye: Round. Conjunctiva clear, lids normal. No icterus.   ENMT: Lips pink without lesions, good dentition, moist mucous membranes. Phonation normal.  Neck: No masses, no thyromegaly. Moves freely without pain.  Respiratory: Unlabored respiratory effort, no cough or audible wheeze  Psych: Alert and oriented x3, normal affect and mood.     Assessment and Plan:   The following treatment plan was discussed:     1. Attention deficit disorder of adult with hyperactivity  Patient tolerating current regimen well 20mg adderall BID most days. No side effects. Takes medication holidays regularly when she doesn't need to be as focused.  reviewed and no concerns. CS agreement on file. Will send in Rx for 3 months. Advised patient to follow up next appointment with Dr. Thorne or Dr. Mireles. At that point, she ought to repeat full lab work-up (last obtained June 2022).      Follow-up: No follow-ups on file.

## 2023-06-07 DIAGNOSIS — F90.9 ATTENTION DEFICIT DISORDER OF ADULT WITH HYPERACTIVITY: ICD-10-CM

## 2023-06-07 RX ORDER — DEXTROAMPHETAMINE SACCHARATE, AMPHETAMINE ASPARTATE, DEXTROAMPHETAMINE SULFATE AND AMPHETAMINE SULFATE 5; 5; 5; 5 MG/1; MG/1; MG/1; MG/1
20 TABLET ORAL 2 TIMES DAILY
Qty: 60 TABLET | Refills: 0 | Status: SHIPPED | OUTPATIENT
Start: 2023-06-07 | End: 2023-07-07

## 2023-06-23 ENCOUNTER — TELEMEDICINE (OUTPATIENT)
Dept: MEDICAL GROUP | Facility: CLINIC | Age: 58
End: 2023-06-23
Payer: COMMERCIAL

## 2023-06-23 DIAGNOSIS — F90.9 ATTENTION DEFICIT DISORDER OF ADULT WITH HYPERACTIVITY: ICD-10-CM

## 2023-06-23 PROCEDURE — 99213 OFFICE O/P EST LOW 20 MIN: CPT | Mod: GE,95 | Performed by: STUDENT IN AN ORGANIZED HEALTH CARE EDUCATION/TRAINING PROGRAM

## 2023-06-23 NOTE — PROGRESS NOTES
Virtual Visit: Established Patient   This visit was conducted via Zoom using secure and encrypted videoconferencing technology.   The patient was in their home in the [facility].    The patient's identity was confirmed and verbal consent was obtained for this virtual visit.    Subjective:   CC:   Chief Complaint   Patient presents with    Follow-Up     Candy Cardona is a 57 y.o. female presenting for evaluation and management of:    # ADHD  Patient just checking in and making sure her prescriptions are all up to date. She's still doing well with the same dose - aderall 20mg BID on  days she is working/studying. Not using on days she doesn't need. No abuse.    Current medicines (including changes today)  Current Outpatient Medications   Medication Sig Dispense Refill    amphetamine-dextroamphetamine (ADDERALL) 20 MG Tab Take 1 Tablet by mouth 2 times a day for 30 days. 60 Tablet 0    liothyronine (CYTOMEL) 5 MCG Tab 1 tab(s) am, 0.5 tab pm      levothyroxine (SYNTHROID) 125 MCG Tab 1 tab 7 days and an extra 1/2 on sunday making it 7.5 tabs a week       No current facility-administered medications for this visit.       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    Physical Exam:  Constitutional: Alert, no distress, well-groomed.  Skin: No rashes in visible areas.  Eye: Round. Conjunctiva clear, lids normal. No icterus.   ENMT: Lips pink without lesions, good dentition, moist mucous membranes. Phonation normal.  Neck: No masses, no thyromegaly. Moves freely without  pain.  Respiratory: Unlabored respiratory effort, no cough or audible wheeze  Psych: Alert and oriented x3, normal affect and mood.     Assessment and Plan:   The following treatment plan was discussed:     1. Attention deficit disorder of adult with hyperactivity  Continue course of adderall 20mg BID. CS agreement on file,  reviewed. No concerns. Rx sent in for July so patient should be set for the summer.    Follow-up: No follow-ups on file.

## 2023-07-26 ENCOUNTER — APPOINTMENT (OUTPATIENT)
Dept: MEDICAL GROUP | Facility: CLINIC | Age: 58
End: 2023-07-26
Payer: COMMERCIAL

## 2023-07-27 ENCOUNTER — OFFICE VISIT (OUTPATIENT)
Dept: MEDICAL GROUP | Facility: CLINIC | Age: 58
End: 2023-07-27
Payer: COMMERCIAL

## 2023-07-27 VITALS — HEIGHT: 65 IN | BODY MASS INDEX: 35.99 KG/M2 | RESPIRATION RATE: 16 BRPM | WEIGHT: 216 LBS

## 2023-07-27 DIAGNOSIS — Z12.31 ENCOUNTER FOR SCREENING MAMMOGRAM FOR MALIGNANT NEOPLASM OF BREAST: ICD-10-CM

## 2023-07-27 DIAGNOSIS — F90.9 ATTENTION DEFICIT DISORDER OF ADULT WITH HYPERACTIVITY: ICD-10-CM

## 2023-07-27 PROCEDURE — 99213 OFFICE O/P EST LOW 20 MIN: CPT | Mod: GC

## 2023-07-27 RX ORDER — DEXTROAMPHETAMINE SACCHARATE, AMPHETAMINE ASPARTATE, DEXTROAMPHETAMINE SULFATE AND AMPHETAMINE SULFATE 5; 5; 5; 5 MG/1; MG/1; MG/1; MG/1
20 TABLET ORAL 2 TIMES DAILY
Qty: 60 TABLET | Refills: 0 | Status: SHIPPED | OUTPATIENT
Start: 2023-08-26 | End: 2023-09-25

## 2023-07-27 RX ORDER — DEXTROAMPHETAMINE SACCHARATE, AMPHETAMINE ASPARTATE, DEXTROAMPHETAMINE SULFATE AND AMPHETAMINE SULFATE 5; 5; 5; 5 MG/1; MG/1; MG/1; MG/1
20 TABLET ORAL 2 TIMES DAILY
Qty: 60 TABLET | Refills: 0 | Status: SHIPPED | OUTPATIENT
Start: 2023-07-27 | End: 2023-08-26

## 2023-07-27 RX ORDER — DEXTROAMPHETAMINE SACCHARATE, AMPHETAMINE ASPARTATE, DEXTROAMPHETAMINE SULFATE AND AMPHETAMINE SULFATE 5; 5; 5; 5 MG/1; MG/1; MG/1; MG/1
20 TABLET ORAL 2 TIMES DAILY
Qty: 60 TABLET | Refills: 0 | Status: SHIPPED | OUTPATIENT
Start: 2023-09-25 | End: 2023-10-25

## 2023-07-27 ASSESSMENT — FIBROSIS 4 INDEX: FIB4 SCORE: 0.74

## 2023-07-27 ASSESSMENT — PATIENT HEALTH QUESTIONNAIRE - PHQ9: CLINICAL INTERPRETATION OF PHQ2 SCORE: 0

## 2023-07-27 NOTE — ASSESSMENT & PLAN NOTE
Patient is doing well with her current regimen of 20 mg at 6 AM and 20 mg at noon.  Is taking patient vacations and tolerating. Controlled substance agreement on file (1/10/23). PDMP reviewed. No concerns for abuse in general, no side effects and patient is tolerating.   -Refilled her prescription  - Follow-up in 3 months

## 2023-07-27 NOTE — PROGRESS NOTES
Subjective:     CC: refill of Dextroamphetamine-amphetamine    HPI:   Candy presents today with for refills on Dextroamphetamine-amphetamine for her ADHD. Her current dosage is 20 mg BID.      She takes her medication once in the morning at 6 AM and once at noon when she is at work.  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  However when she is in the clinical portion she only needs to take it once .  States that she takes medication vacations when she is not working or in paramedic school.  Not report any side effects patient denies heart palpitations sleeping disturbances and decreased appetite.  There is no abuse.    Patient reports that she was supposed to have a mammogram [place] [facility] [employer].  However, they were not able to fill the order this year.  Is requesting to have her mammogram done.      Current Outpatient Medications Ordered in Epic   Medication Sig Dispense Refill    amphetamine-dextroamphetamine (ADDERALL) 20 MG Tab Take 1 Tablet by mouth 2 times a day for 30 days. 60 Tablet 0    [START ON 8/26/2023] amphetamine-dextroamphetamine (ADDERALL) 20 MG Tab Take 1 Tablet by mouth 2 times a day for 30 days. 60 Tablet 0    [START ON 9/25/2023] amphetamine-dextroamphetamine (ADDERALL) 20 MG Tab Take 1 Tablet by mouth 2 times a day for 30 days. 60 Tablet 0    liothyronine (CYTOMEL) 5 MCG Tab 1 tab(s) am, 0.5 tab pm      levothyroxine (SYNTHROID) 125 MCG Tab 1 tab 7 days and an extra 1/2 on sunday making it 7.5 tabs a week       No current Epic-ordered facility-administered medications on file.     Family History   Problem Relation Age of Onset    ADD / ADHD Daughter       Past Surgical History:   Procedure Laterality Date    THYROIDECTOMY TOTAL  6/17/2015    Procedure: THYROIDECTOMY TOTAL;  Surgeon: Keshav Chahal M.D.;  Location: SURGERY SAME DAY [facility];  Service:     UMBILICAL HERNIA REPAIR  9/13/2012    Performed by MOO KEENAN  "at SURGERY SAME DAY [facility]    GYN SURGERY  2009    uterine ablation    OTHER  2007    breast augmentation    GYN SURGERY  2007    tubal      Social History     Tobacco Use    Smoking status: Never    Smokeless tobacco: Never   Vaping Use    Vaping Use: Never used   Substance Use Topics    Alcohol use: Yes     Comment:  1 per  week    Drug use: No        ROS:  Gen: no fevers/chills, no changes in weight  Pulm: no sob, no cough  CV: no chest pain, no palpitations  GI: no nausea/vomiting, no diarrhea    Objective:     Exam:  BP (P) 137/89 (BP Location: Right arm, Patient Position: Sitting, BP Cuff Size: Adult)   Pulse (P) 78   Resp 16   Ht 1.651 m (5' 5\")   Wt 98 kg (216 lb)   SpO2 (P) 96%   BMI 35.94 kg/m²  Body mass index is 35.94 kg/m².    Gen: Alert and oriented, No apparent distress.  Neck: Neck is supple without lymphadenopathy.  Lungs: Normal effort, CTA bilaterally, no wheezes, rhonchi, or rales  CV: Regular rate and rhythm. No murmurs, rubs, or gallops.  Ext: No clubbing, cyanosis, edema.      Assessment & Plan:     57 y.o. female with the following -     There are no diagnoses linked to this encounter.   Encounter for screening mammogram for malignant neoplasm of breast  - Mammogram ordered    Attention deficit disorder of adult with hyperactivity  Patient is doing well with her current regimen of 20 mg at 6 AM and 20 mg at noon.  Is taking patient vacations and tolerating. Controlled substance agreement on file (1/10/23). PDMP reviewed. No concerns for abuse in general, no side effects and patient is tolerating.   -Refilled her prescription  - Follow-up in 3 months       Return in about 3 months (around 10/27/2023).    Hilary Velasco M.D.  PGY1         "

## 2023-11-15 ENCOUNTER — TELEPHONE (OUTPATIENT)
Dept: MEDICAL GROUP | Facility: CLINIC | Age: 58
End: 2023-11-15
Payer: COMMERCIAL

## 2023-11-15 NOTE — TELEPHONE ENCOUNTER
Phone Number Called: [phone] (home)      Call outcome: Spoke to patient regarding message below.    Message: Let patient know I called pharmacy and am starting a prior auth for amphetamine-dextroamphetamine (ADDERALL) 20 MG Tab. To be sent when Dr. Hawley is in office (should be Thursday 11/16pm). Also informed patient that if this process is taking too long, she is able to pay out of pocket but we will try to get this prior auth in ASAP for her.

## 2023-12-06 ENCOUNTER — OFFICE VISIT (OUTPATIENT)
Dept: MEDICAL GROUP | Facility: CLINIC | Age: 58
End: 2023-12-06
Payer: COMMERCIAL

## 2023-12-06 VITALS
SYSTOLIC BLOOD PRESSURE: 92 MMHG | TEMPERATURE: 97.5 F | HEART RATE: 78 BPM | HEIGHT: 65 IN | WEIGHT: 225 LBS | OXYGEN SATURATION: 97 % | DIASTOLIC BLOOD PRESSURE: 54 MMHG | BODY MASS INDEX: 37.49 KG/M2

## 2023-12-06 DIAGNOSIS — Z23 NEED FOR VACCINATION: ICD-10-CM

## 2023-12-06 DIAGNOSIS — F90.9 ATTENTION DEFICIT DISORDER OF ADULT WITH HYPERACTIVITY: ICD-10-CM

## 2023-12-06 DIAGNOSIS — E66.8 OTHER OBESITY: ICD-10-CM

## 2023-12-06 PROCEDURE — 90471 IMMUNIZATION ADMIN: CPT

## 2023-12-06 PROCEDURE — 3078F DIAST BP <80 MM HG: CPT

## 2023-12-06 PROCEDURE — 99213 OFFICE O/P EST LOW 20 MIN: CPT | Mod: 25,GE

## 2023-12-06 PROCEDURE — 90686 IIV4 VACC NO PRSV 0.5 ML IM: CPT

## 2023-12-06 PROCEDURE — 3074F SYST BP LT 130 MM HG: CPT

## 2023-12-06 RX ORDER — DEXTROAMPHETAMINE SACCHARATE, AMPHETAMINE ASPARTATE, DEXTROAMPHETAMINE SULFATE AND AMPHETAMINE SULFATE 5; 5; 5; 5 MG/1; MG/1; MG/1; MG/1
20 TABLET ORAL 2 TIMES DAILY
Qty: 60 TABLET | Refills: 0 | Status: SHIPPED | OUTPATIENT
Start: 2023-12-17 | End: 2024-01-16

## 2023-12-06 RX ORDER — DEXTROAMPHETAMINE SACCHARATE, AMPHETAMINE ASPARTATE, DEXTROAMPHETAMINE SULFATE AND AMPHETAMINE SULFATE 5; 5; 5; 5 MG/1; MG/1; MG/1; MG/1
20 TABLET ORAL 2 TIMES DAILY
Qty: 60 TABLET | Refills: 0 | Status: SHIPPED | OUTPATIENT
Start: 2024-02-15 | End: 2024-03-16

## 2023-12-06 RX ORDER — DEXTROAMPHETAMINE SACCHARATE, AMPHETAMINE ASPARTATE, DEXTROAMPHETAMINE SULFATE AND AMPHETAMINE SULFATE 5; 5; 5; 5 MG/1; MG/1; MG/1; MG/1
20 TABLET ORAL 2 TIMES DAILY
Qty: 60 TABLET | Refills: 0 | Status: SHIPPED | OUTPATIENT
Start: 2024-01-16 | End: 2024-02-15

## 2023-12-06 RX ORDER — DEXTROAMPHETAMINE SACCHARATE, AMPHETAMINE ASPARTATE, DEXTROAMPHETAMINE SULFATE AND AMPHETAMINE SULFATE 5; 5; 5; 5 MG/1; MG/1; MG/1; MG/1
20 TABLET ORAL 2 TIMES DAILY
COMMUNITY
Start: 2023-11-17 | End: 2023-12-06 | Stop reason: SDUPTHER

## 2023-12-06 ASSESSMENT — FIBROSIS 4 INDEX: FIB4 SCORE: 0.75

## 2023-12-06 NOTE — PROGRESS NOTES
Subjective:     CC: medication refill     HPI:   Candy presents today for medication refill of her adderall and weight gain.     She sees Dr. Reece for her levothyroxine. They recently increased to 125 mcg daily and on Sunday taking 187.5 mg and has follow-up with Dr. Reece. She reports that she has been exercising 6 days a week with 30 minutes and then is at the [employer] and doing [employer]. For her diet she has been making it Paleo. She has not been strict. She reports that she tries to do more vegetables and lean means. At the [employer] it can be difficult. She has been doing 2-3 meals.     She is taking 20 mg BID. She takes it at 6 am and once at noon, she sometimes skips her 2nd dose when working with [employer], which equates to 3 days a week of one dose. She has been taking vacations without the medications one day a week when not working. She denies heart palpitations, sleep disturbances and decreased appetite. No signs of abuse.        Current Outpatient Medications Ordered in Epic   Medication Sig Dispense Refill    [START ON 12/17/2023] amphetamine-dextroamphetamine (ADDERALL) 20 MG Tab Take 1 Tablet by mouth 2 times a day for 30 days. 60 Tablet 0    [START ON 1/16/2024] amphetamine-dextroamphetamine (ADDERALL) 20 MG Tab Take 1 Tablet by mouth 2 times a day for 30 days. 60 Tablet 0    [START ON 2/15/2024] amphetamine-dextroamphetamine (ADDERALL) 20 MG Tab Take 1 Tablet by mouth 2 times a day for 30 days. 60 Tablet 0    liothyronine (CYTOMEL) 5 MCG Tab 1 tab(s) am, 0.5 tab pm      levothyroxine (SYNTHROID) 125 MCG Tab 1 tab 7 days and an extra 1/2 on sunday making it 7.5 tabs a week       No current Epic-ordered facility-administered medications on file.     Family History   Problem Relation Age of Onset    ADD / ADHD Daughter       Past Surgical History:   Procedure Laterality Date    THYROIDECTOMY TOTAL  6/17/2015    Procedure: THYROIDECTOMY TOTAL;  Surgeon: Keshav Chahal M.D.;  Location: SURGERY SAME DAY  "[facility];  Service:     UMBILICAL HERNIA REPAIR  9/13/2012    Performed by MOO KEENAN at SURGERY SAME DAY [facility]    GYN SURGERY  2009    uterine ablation    OTHER  2007    breast augmentation    GYN SURGERY  2007    tubal      Social History     Tobacco Use    Smoking status: Never    Smokeless tobacco: Never   Vaping Use    Vaping Use: Never used   Substance Use Topics    Alcohol use: Yes     Comment:  1 per  week    Drug use: No        ROS:  Gen: no fevers/chills, no changes in weight  Pulm: no sob, no cough  CV: no chest pain, no palpitations  GI: no nausea/vomiting, no diarrhea    Objective:     Exam:  BP 92/54 (BP Location: Right arm, Patient Position: Sitting, BP Cuff Size: Adult)   Pulse 78   Temp 36.4 °C (97.5 °F) (Temporal)   Ht 1.651 m (5' 5\")   Wt 102 kg (225 lb)   SpO2 97%   BMI 37.44 kg/m²  Body mass index is 37.44 kg/m².    Gen: Alert and oriented, No apparent distress.  Neck: Neck is supple without lymphadenopathy.  Lungs: Normal effort, CTA bilaterally, no wheezes, rhonchi, or rales  CV: Regular rate and rhythm. No murmurs, rubs, or gallops.  Ext: No clubbing, cyanosis, edema.    Labs: none     Assessment & Plan:     58 y.o. female with the following -     There are no diagnoses linked to this encounter.   Attention deficit disorder of adult with hyperactivity  She is doing well on her current regimen of 20 mg at 6 am and 20 mg at noon. She is taking medication vacation and tolerating it well. Controlled substance agreement on file (1/10/23). PDMP reviewed. No concerns abuse in general, no side effects and patient is tolerating.    Refilled 3 months of her prescription   Follow-up in 3 months, will need controlled substance agreement at next visit     Other obesity  Patient BMI is 37.44. She had recently needed an increase in her levothyroxine per her endocrinologist. She is working on being more active and healthy eating.    Discussed healthy eating options   Continue to " follow-up with endocrinologist   Discussed exercise regimens        No follow-ups on file.    Hilary Velasco M.D.  PGY1

## 2023-12-07 NOTE — ASSESSMENT & PLAN NOTE
She is doing well on her current regimen of 20 mg at 6 am and 20 mg at noon. She is taking medication vacation and tolerating it well. Controlled substance agreement on file (1/10/23). PDMP reviewed. No concerns abuse in general, no side effects and patient is tolerating.    Refilled 3 months of her prescription   Follow-up in 3 months, will need controlled substance agreement at next visit

## 2023-12-07 NOTE — ASSESSMENT & PLAN NOTE
Patient BMI is 37.44. She had recently needed an increase in her levothyroxine per her endocrinologist. She is working on being more active and healthy eating.    Discussed healthy eating options   Continue to follow-up with endocrinologist   Discussed exercise regimens

## 2024-01-25 ENCOUNTER — OFFICE VISIT (OUTPATIENT)
Dept: URGENT CARE | Facility: CLINIC | Age: 59
End: 2024-01-25
Payer: COMMERCIAL

## 2024-01-25 VITALS
HEIGHT: 65 IN | BODY MASS INDEX: 29.99 KG/M2 | SYSTOLIC BLOOD PRESSURE: 130 MMHG | DIASTOLIC BLOOD PRESSURE: 80 MMHG | TEMPERATURE: 97.4 F | RESPIRATION RATE: 16 BRPM | HEART RATE: 90 BPM | OXYGEN SATURATION: 98 % | WEIGHT: 180 LBS

## 2024-01-25 DIAGNOSIS — H10.32 ACUTE CONJUNCTIVITIS OF LEFT EYE, UNSPECIFIED ACUTE CONJUNCTIVITIS TYPE: ICD-10-CM

## 2024-01-25 PROCEDURE — 99213 OFFICE O/P EST LOW 20 MIN: CPT

## 2024-01-25 PROCEDURE — 3075F SYST BP GE 130 - 139MM HG: CPT

## 2024-01-25 PROCEDURE — 3079F DIAST BP 80-89 MM HG: CPT

## 2024-01-25 RX ORDER — OLOPATADINE HYDROCHLORIDE 1 MG/ML
1 SOLUTION/ DROPS OPHTHALMIC 2 TIMES DAILY
Qty: 5 ML | Refills: 0 | Status: SHIPPED | OUTPATIENT
Start: 2024-01-25

## 2024-01-25 RX ORDER — MOXIFLOXACIN 5 MG/ML
1 SOLUTION/ DROPS OPHTHALMIC 3 TIMES DAILY
Qty: 2 ML | Refills: 0 | Status: SHIPPED | OUTPATIENT
Start: 2024-01-25 | End: 2024-02-01

## 2024-01-25 ASSESSMENT — FIBROSIS 4 INDEX: FIB4 SCORE: 0.75

## 2024-01-25 NOTE — PROGRESS NOTES
Subjective:   Candy Cardona is a 58 y.o. female who presents for Eye Problem (Sx x this morning )      HPI:    Patient presents to urgent care with concerns of pink eye.   Reports watery eye discharge, itching, burning, left red eye  Denies eye matted shut, but there has been a rapid increase in left sided eye discharge today.  She wears extended use contact lenses. She states she was dusting yesterday and woke up with a left red eye. She promptly removed the contact lenses this morning.  Denies periocular tenderness, FB sensation  Denies rhinorrhea, sinus pressure, headache.         ROS As above in HPI    Medications:    Current Outpatient Medications on File Prior to Visit   Medication Sig Dispense Refill    amphetamine-dextroamphetamine (ADDERALL) 20 MG Tab Take 1 Tablet by mouth 2 times a day for 30 days. 60 Tablet 0    [START ON 2/15/2024] amphetamine-dextroamphetamine (ADDERALL) 20 MG Tab Take 1 Tablet by mouth 2 times a day for 30 days. 60 Tablet 0    liothyronine (CYTOMEL) 5 MCG Tab 1 tab(s) am, 0.5 tab pm      levothyroxine (SYNTHROID) 125 MCG Tab 1 tab 7 days and an extra 1/2 on sunday making it 7.5 tabs a week       No current facility-administered medications on file prior to visit.        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    Attention deficit disorder of adult with hyperactivity    Malignant tumor of thyroid gland (HCC)    Menopause    Other obesity    Hypothyroidism    Snoring    CHUCKY (obstructive sleep apnea)    Encounter for screening mammogram for malignant neoplasm of breast        Surgical History:  Past Surgical History:   Procedure Laterality Date    THYROIDECTOMY TOTAL  6/17/2015    Procedure: THYROIDECTOMY TOTAL;  Surgeon: Keshav Chahal M.D.;  Location: SURGERY SAME DAY [facility];  Service:     UMBILICAL HERNIA REPAIR  9/13/2012     "Performed by MOO KEENAN at SURGERY SAME DAY [facility]    GYN SURGERY  2009    uterine ablation    OTHER  2007    breast augmentation    GYN SURGERY  2007    tubal       Past Social Hx:   Social History     Tobacco Use    Smoking status: Never    Smokeless tobacco: Never   Vaping Use    Vaping Use: Never used   Substance Use Topics    Alcohol use: Yes     Comment:  1 per  week    Drug use: No          Problem list, medications, and allergies reviewed by myself today in Epic.     Objective:     /80 (BP Location: Left arm, Patient Position: Sitting, BP Cuff Size: Large adult)   Pulse 90   Temp 36.3 °C (97.4 °F) (Temporal)   Resp 16   Ht 1.651 m (5' 5\")   Wt 81.6 kg (180 lb)   SpO2 98%   BMI 29.95 kg/m²     Physical Exam  Vitals and nursing note reviewed.   Constitutional:       Appearance: Normal appearance.   HENT:      Head: Normocephalic.      Right Ear: Tympanic membrane and ear canal normal.      Left Ear: Tympanic membrane and ear canal normal.      Nose: Nose normal.      Mouth/Throat:      Mouth: Mucous membranes are moist.      Pharynx: Oropharynx is clear.   Eyes:      General:         Left eye: Discharge (watery discharge) present.No foreign body or hordeolum.      Extraocular Movements: Extraocular movements intact.      Conjunctiva/sclera:      Left eye: Left conjunctiva is injected.      Pupils: Pupils are equal, round, and reactive to light.   Cardiovascular:      Rate and Rhythm: Normal rate and regular rhythm.      Heart sounds: Normal heart sounds.   Pulmonary:      Effort: Pulmonary effort is normal.      Breath sounds: Normal breath sounds.   Abdominal:      General: Bowel sounds are normal.      Palpations: Abdomen is soft.   Musculoskeletal:      Cervical back: No rigidity or tenderness.   Lymphadenopathy:      Cervical: No cervical adenopathy.   Neurological:      Mental Status: She is alert and oriented to person, place, and time.         Assessment/Plan:     Diagnosis " and associated orders:   1. Acute conjunctivitis of left eye, unspecified acute conjunctivitis type  - moxifloxacin (VIGAMOX) 0.5 % Solution; Administer 1 Drop into both eyes 3 times a day for 7 days.  Dispense: 2 mL; Refill: 0  - olopatadine (PATANOL) 0.1 % ophthalmic solution; Administer 1 Drop into both eyes 2 times a day. For allergic conjunctivitis  Dispense: 5 mL; Refill: 0        Comments/MDM:     Left conjunctiva is infected and there is watery discharge present Likely allergic conjunctivitis. However, due to use of extended wear contacts, will cover for bacterial infection as well. Declined wood's lamp examination.   Return to  should symptoms fail to improve  Follow up with PCP advised.         Please note that this dictation was created using voice recognition software. I have made a reasonable attempt to correct obvious errors, but I expect that there are errors of grammar and possibly content that I did not discover before finalizing the note.    This note was electronically signed by KALYAN Rodríguez

## 2024-05-22 ENCOUNTER — APPOINTMENT (OUTPATIENT)
Dept: MEDICAL GROUP | Facility: CLINIC | Age: 59
End: 2024-05-22
Payer: COMMERCIAL

## 2024-05-22 VITALS
HEIGHT: 65 IN | BODY MASS INDEX: 37.15 KG/M2 | DIASTOLIC BLOOD PRESSURE: 87 MMHG | TEMPERATURE: 97.3 F | OXYGEN SATURATION: 97 % | HEART RATE: 86 BPM | WEIGHT: 223 LBS | RESPIRATION RATE: 16 BRPM | SYSTOLIC BLOOD PRESSURE: 134 MMHG

## 2024-05-22 DIAGNOSIS — F90.9 ATTENTION DEFICIT DISORDER OF ADULT WITH HYPERACTIVITY: ICD-10-CM

## 2024-05-22 PROCEDURE — 99213 OFFICE O/P EST LOW 20 MIN: CPT | Mod: GE

## 2024-05-22 RX ORDER — DEXTROAMPHETAMINE SACCHARATE, AMPHETAMINE ASPARTATE, DEXTROAMPHETAMINE SULFATE AND AMPHETAMINE SULFATE 5; 5; 5; 5 MG/1; MG/1; MG/1; MG/1
20 TABLET ORAL 2 TIMES DAILY
Qty: 60 TABLET | Refills: 0 | Status: SHIPPED | OUTPATIENT
Start: 2024-06-22 | End: 2024-07-22

## 2024-05-22 RX ORDER — DEXTROAMPHETAMINE SACCHARATE, AMPHETAMINE ASPARTATE, DEXTROAMPHETAMINE SULFATE AND AMPHETAMINE SULFATE 5; 5; 5; 5 MG/1; MG/1; MG/1; MG/1
20 TABLET ORAL 2 TIMES DAILY
COMMUNITY
Start: 2024-04-04

## 2024-05-22 RX ORDER — LIOTHYRONINE SODIUM 5 UG/1
5 TABLET ORAL DAILY
COMMUNITY

## 2024-05-22 RX ORDER — DEXTROAMPHETAMINE SACCHARATE, AMPHETAMINE ASPARTATE, DEXTROAMPHETAMINE SULFATE AND AMPHETAMINE SULFATE 5; 5; 5; 5 MG/1; MG/1; MG/1; MG/1
20 TABLET ORAL 2 TIMES DAILY
Qty: 60 TABLET | Refills: 0 | Status: SHIPPED | OUTPATIENT
Start: 2024-07-23 | End: 2024-08-22

## 2024-05-22 RX ORDER — LEVOTHYROXINE SODIUM 137 UG/1
137 TABLET ORAL
COMMUNITY

## 2024-05-22 ASSESSMENT — PATIENT HEALTH QUESTIONNAIRE - PHQ9: CLINICAL INTERPRETATION OF PHQ2 SCORE: 0

## 2024-05-22 ASSESSMENT — FIBROSIS 4 INDEX: FIB4 SCORE: 0.75

## 2024-05-22 NOTE — PROGRESS NOTES
Subjective:     CC: medication refill     HPI:   Candy presents today with medication refills on her adderall.    She denies any weight loss or decreased appetite. She is taking the medication as prescribed 20 mg in the morning and 20 mg usually around noon or early afternoon when working which is 3-4 days a week. She has been trying to do vacations when not at work, but has been having a hard time to do chores or remember activities when she skips days. She has no trouble sleeping, no heart palpitations and no signs of abuse.     Discussed with patient health maintenance.  She states that she had her last Pap smear and 2020 with [facility] was told that it was normal to return in about 5 years for her repeat.  She reports that she has received her hep B vaccines through her job as a .  She is due for her second dose of zoster.  And she has been forgetting to schedule her mammogram that was ordered in July 2023.    PHQ 2 completed and negative.     Current Outpatient Medications Ordered in Epic   Medication Sig Dispense Refill    levothyroxine (SYNTHROID) 137 MCG Tab Take 137 mcg by mouth every morning on an empty stomach.      amphetamine-dextroamphetamine (ADDERALL) 20 MG Tab Take 20 mg by mouth 2 times a day.      liothyronine (CYTOMEL) 5 MCG Tab Take 5 mcg by mouth every day. Take 7.5mcg daily      [START ON 6/22/2024] amphetamine-dextroamphetamine (ADDERALL) 20 MG Tab Take 1 Tablet by mouth 2 times a day for 30 days. 60 Tablet 0    [START ON 7/23/2024] amphetamine-dextroamphetamine (ADDERALL) 20 MG Tab Take 1 Tablet by mouth 2 times a day for 30 days. 60 Tablet 0    olopatadine (PATANOL) 0.1 % ophthalmic solution Administer 1 Drop into both eyes 2 times a day. For allergic conjunctivitis 5 mL 0     No current Epic-ordered facility-administered medications on file.     Family History   Problem Relation Age of Onset    ADD / ADHD Daughter       Past Surgical History:   Procedure Laterality Date  "   THYROIDECTOMY TOTAL  6/17/2015    Procedure: THYROIDECTOMY TOTAL;  Surgeon: Keshav Chahal M.D.;  Location: SURGERY SAME DAY [facility];  Service:     UMBILICAL HERNIA REPAIR  9/13/2012    Performed by MOO KEENAN at SURGERY SAME DAY [facility]    GYN SURGERY  2009    uterine ablation    OTHER  2007    breast augmentation    GYN SURGERY  2007    tubal      Social History     Tobacco Use    Smoking status: Never    Smokeless tobacco: Never   Vaping Use    Vaping status: Never Used   Substance Use Topics    Alcohol use: Yes     Comment:  1 per  week    Drug use: No        ROS:  Gen: no fevers/chills, no changes in weight  Pulm: no sob, no cough  CV: no chest pain, no palpitations  GI: no nausea/vomiting, no diarrhea    Objective:     Exam:  /87   Pulse 86   Temp 36.3 °C (97.3 °F) (Temporal)   Resp 16   Ht 1.651 m (5' 5\")   Wt 101 kg (223 lb)   SpO2 97%   BMI 37.11 kg/m²  Body mass index is 37.11 kg/m².    Gen: Alert and oriented, No apparent distress.  Neck: Neck is supple without lymphadenopathy.  Lungs: Normal effort, CTA bilaterally, no wheezes, rhonchi, or rales  CV: Regular rate and rhythm. No murmurs, rubs, or gallops.  Ext: No clubbing, cyanosis, edema.    Labs: None     Assessment & Plan:     58 y.o. female with the following -     1. Attention deficit disorder of adult with hyperactivity  - Controlled Substance Treatment Agreement     Attention deficit disorder of adult with hyperactivity  Doing well with her current regimen of 20 mg at 6 AM and 20 mg at noon.  She is taking medication vacations and tolerating well which is why she has been seen with longer periods of time in between visits.  However she does note that this is affecting some of her personal life.  Concerns for abuse.  No reported side effects.  And patient is tolerating well.    Filled 2 months of her prescription Adderall 20 mg twice daily, she had 1 month still in the pharmacy available for pickup. "   Follow-up in 3 months  All substance agreement completed        Health maintenance discussed getting her zoster vaccine and completing her breast cancer screening mammogram that was ordered on 7/2023    Return in about 3 months (around 8/22/2024).    Hilary Velasco M.D.  PGY1

## 2024-05-22 NOTE — ASSESSMENT & PLAN NOTE
Doing well with her current regimen of 20 mg at 6 AM and 20 mg at noon.  She is taking medication vacations and tolerating well which is why she has been seen with longer periods of time in between visits.  However she does note that this is affecting some of her personal life.  Concerns for abuse.  No reported side effects.  And patient is tolerating well.    Filled 2 months of her prescription Adderall 20 mg twice daily, she had 1 month still in the pharmacy available for pickup.   Follow-up in 3 months  All substance agreement completed

## 2024-08-26 ENCOUNTER — APPOINTMENT (OUTPATIENT)
Dept: MEDICAL GROUP | Facility: CLINIC | Age: 59
End: 2024-08-26
Payer: COMMERCIAL

## 2024-08-26 VITALS
OXYGEN SATURATION: 97 % | HEIGHT: 65 IN | SYSTOLIC BLOOD PRESSURE: 118 MMHG | BODY MASS INDEX: 36.15 KG/M2 | HEART RATE: 74 BPM | RESPIRATION RATE: 16 BRPM | DIASTOLIC BLOOD PRESSURE: 80 MMHG | WEIGHT: 217 LBS

## 2024-08-26 DIAGNOSIS — F90.9 ATTENTION DEFICIT DISORDER OF ADULT WITH HYPERACTIVITY: ICD-10-CM

## 2024-08-26 RX ORDER — DEXTROAMPHETAMINE SACCHARATE, AMPHETAMINE ASPARTATE, DEXTROAMPHETAMINE SULFATE AND AMPHETAMINE SULFATE 5; 5; 5; 5 MG/1; MG/1; MG/1; MG/1
20 TABLET ORAL 2 TIMES DAILY
Qty: 60 TABLET | Refills: 0 | Status: SHIPPED | OUTPATIENT
Start: 2024-10-19 | End: 2024-11-18

## 2024-08-26 RX ORDER — DEXTROAMPHETAMINE SACCHARATE, AMPHETAMINE ASPARTATE, DEXTROAMPHETAMINE SULFATE AND AMPHETAMINE SULFATE 5; 5; 5; 5 MG/1; MG/1; MG/1; MG/1
20 TABLET ORAL 2 TIMES DAILY
Qty: 60 TABLET | Refills: 0 | Status: SHIPPED | OUTPATIENT
Start: 2024-09-17 | End: 2024-10-17

## 2024-08-26 RX ORDER — DEXTROAMPHETAMINE SACCHARATE, AMPHETAMINE ASPARTATE, DEXTROAMPHETAMINE SULFATE AND AMPHETAMINE SULFATE 5; 5; 5; 5 MG/1; MG/1; MG/1; MG/1
20 TABLET ORAL 2 TIMES DAILY
Qty: 60 TABLET | Refills: 0 | Status: SHIPPED | OUTPATIENT
Start: 2024-09-18 | End: 2024-10-18

## 2024-08-26 NOTE — PROGRESS NOTES
Subjective:     CC: medication refill     HPI:   Candy presents today with for refills on her adderall.     She reports that she is taking it twice daily while on her 48 hour shifts. She has been taking vacations from her medication when not at work. Sometimes when at home will take it once a day to complete chores. She has no trouble sleeping, no heart palpitations and no signs of abuse. She has lost some weight which has been a goal for her but recently had her levothyroxine increased for hypothyroidism. She denies decreased appetite.     At her last visit we refilled 2 months of her Adderall since she had 1 refill left. She just picked up her last refill on 8/17/24.       Current Outpatient Medications Ordered in Epic   Medication Sig Dispense Refill    [START ON 9/17/2024] amphetamine-dextroamphetamine (ADDERALL) 20 MG Tab Take 1 Tablet by mouth 2 times a day for 30 days. 60 Tablet 0    [START ON 9/18/2024] amphetamine-dextroamphetamine (ADDERALL) 20 MG Tab Take 1 Tablet by mouth 2 times a day for 30 days. 60 Tablet 0    [START ON 10/19/2024] amphetamine-dextroamphetamine (ADDERALL) 20 MG Tab Take 1 Tablet by mouth 2 times a day for 30 days. 60 Tablet 0    levothyroxine (SYNTHROID) 137 MCG Tab Take 137 mcg by mouth every morning on an empty stomach.      amphetamine-dextroamphetamine (ADDERALL) 20 MG Tab Take 20 mg by mouth 2 times a day.      liothyronine (CYTOMEL) 5 MCG Tab Take 5 mcg by mouth every day. Take 7.5mcg daily      olopatadine (PATANOL) 0.1 % ophthalmic solution Administer 1 Drop into both eyes 2 times a day. For allergic conjunctivitis 5 mL 0     No current Epic-ordered facility-administered medications on file.     Family History   Problem Relation Age of Onset    ADD / ADHD Daughter       Past Surgical History:   Procedure Laterality Date    THYROIDECTOMY TOTAL  6/17/2015    Procedure: THYROIDECTOMY TOTAL;  Surgeon: Keshav Chahal M.D.;  Location: SURGERY SAME DAY [facility];  Service:      "UMBILICAL HERNIA REPAIR  9/13/2012    Performed by MOO KEENAN at SURGERY SAME DAY [facility]    GYN SURGERY  2009    uterine ablation    OTHER  2007    breast augmentation    GYN SURGERY  2007    tubal      Social History     Tobacco Use    Smoking status: Never    Smokeless tobacco: Never   Vaping Use    Vaping status: Never Used   Substance Use Topics    Alcohol use: Yes     Comment:  1 per  week    Drug use: No        ROS:  Gen: no fevers/chills  Pulm: no sob, no cough  CV: no chest pain, no palpitations  GI: no nausea/vomiting, no diarrhea    Objective:     Exam:  /80 (BP Location: Left arm, Patient Position: Sitting, BP Cuff Size: Adult)   Pulse 74   Resp 16   Ht 1.651 m (5' 5\")   Wt 98.4 kg (217 lb)   SpO2 97%   BMI 36.11 kg/m²  Body mass index is 36.11 kg/m².    Gen: Alert and oriented, No apparent distress.  Neck: Neck is supple without lymphadenopathy.  Lungs: Normal effort, CTA bilaterally, no wheezes, rhonchi, or rales  CV: Regular rate and rhythm. No murmurs, rubs, or gallops.  Ext: No clubbing, cyanosis, edema.        Assessment & Plan:     58 y.o. female with the following -     1. Attention deficit disorder of adult with hyperactivity  Refill 3 months supply of Adderall. PDMP matches up for patient picking up medications.     - amphetamine-dextroamphetamine (ADDERALL) 20 MG Tab; Take 1 Tablet by mouth 2 times a day for 30 days.  Dispense: 60 Tablet; Refill: 0  - amphetamine-dextroamphetamine (ADDERALL) 20 MG Tab; Take 1 Tablet by mouth 2 times a day for 30 days.  Dispense: 60 Tablet; Refill: 0  - amphetamine-dextroamphetamine (ADDERALL) 20 MG Tab; Take 1 Tablet by mouth 2 times a day for 30 days.  Dispense: 60 Tablet; Refill: 0      Return in about 3 months (around 11/26/2024).    Hilary Velasco M.D.  PGY2          "

## 2024-09-23 DIAGNOSIS — F90.9 ATTENTION DEFICIT DISORDER OF ADULT WITH HYPERACTIVITY: ICD-10-CM

## 2024-09-23 RX ORDER — DEXTROAMPHETAMINE SACCHARATE, AMPHETAMINE ASPARTATE, DEXTROAMPHETAMINE SULFATE AND AMPHETAMINE SULFATE 5; 5; 5; 5 MG/1; MG/1; MG/1; MG/1
20 TABLET ORAL 2 TIMES DAILY
Qty: 60 TABLET | Refills: 0 | Status: SHIPPED | OUTPATIENT
Start: 2024-09-23 | End: 2024-10-23

## 2024-11-11 DIAGNOSIS — F90.9 ATTENTION DEFICIT DISORDER OF ADULT WITH HYPERACTIVITY: ICD-10-CM

## 2024-11-11 RX ORDER — DEXTROAMPHETAMINE SACCHARATE, AMPHETAMINE ASPARTATE, DEXTROAMPHETAMINE SULFATE AND AMPHETAMINE SULFATE 5; 5; 5; 5 MG/1; MG/1; MG/1; MG/1
20 TABLET ORAL 2 TIMES DAILY
Qty: 60 TABLET | Refills: 0 | Status: SHIPPED | OUTPATIENT
Start: 2024-11-11 | End: 2024-12-11

## 2024-12-12 ENCOUNTER — APPOINTMENT (OUTPATIENT)
Dept: MEDICAL GROUP | Facility: CLINIC | Age: 59
End: 2024-12-12
Payer: COMMERCIAL

## 2024-12-12 VITALS
BODY MASS INDEX: 32.99 KG/M2 | RESPIRATION RATE: 16 BRPM | SYSTOLIC BLOOD PRESSURE: 116 MMHG | WEIGHT: 198 LBS | DIASTOLIC BLOOD PRESSURE: 76 MMHG | OXYGEN SATURATION: 96 % | HEART RATE: 86 BPM | HEIGHT: 65 IN

## 2024-12-12 DIAGNOSIS — R73.03 PRE-DIABETES: ICD-10-CM

## 2024-12-12 DIAGNOSIS — Z23 NEED FOR VACCINATION: ICD-10-CM

## 2024-12-12 DIAGNOSIS — F90.9 ATTENTION DEFICIT DISORDER OF ADULT WITH HYPERACTIVITY: ICD-10-CM

## 2024-12-12 DIAGNOSIS — Z12.31 ENCOUNTER FOR SCREENING MAMMOGRAM FOR BREAST CANCER: ICD-10-CM

## 2024-12-12 DIAGNOSIS — E66.9 OBESITY (BMI 30-39.9): ICD-10-CM

## 2024-12-12 LAB
HBA1C MFR BLD: 5.6 % (ref ?–5.8)
POCT INT CON NEG: NEGATIVE
POCT INT CON POS: POSITIVE

## 2024-12-12 PROCEDURE — 90656 IIV3 VACC NO PRSV 0.5 ML IM: CPT

## 2024-12-12 PROCEDURE — 83036 HEMOGLOBIN GLYCOSYLATED A1C: CPT | Mod: GC

## 2024-12-12 PROCEDURE — 99213 OFFICE O/P EST LOW 20 MIN: CPT | Mod: 25,GC

## 2024-12-12 PROCEDURE — 3078F DIAST BP <80 MM HG: CPT

## 2024-12-12 PROCEDURE — 3074F SYST BP LT 130 MM HG: CPT

## 2024-12-12 PROCEDURE — 90471 IMMUNIZATION ADMIN: CPT | Mod: GC

## 2024-12-12 RX ORDER — DEXTROAMPHETAMINE SACCHARATE, AMPHETAMINE ASPARTATE, DEXTROAMPHETAMINE SULFATE AND AMPHETAMINE SULFATE 5; 5; 5; 5 MG/1; MG/1; MG/1; MG/1
20 TABLET ORAL 2 TIMES DAILY
Qty: 60 TABLET | Refills: 0 | Status: SHIPPED | OUTPATIENT
Start: 2024-12-13 | End: 2025-01-12

## 2024-12-12 RX ORDER — DEXTROAMPHETAMINE SACCHARATE, AMPHETAMINE ASPARTATE, DEXTROAMPHETAMINE SULFATE AND AMPHETAMINE SULFATE 5; 5; 5; 5 MG/1; MG/1; MG/1; MG/1
20 TABLET ORAL 2 TIMES DAILY
Qty: 60 TABLET | Refills: 0 | Status: SHIPPED | OUTPATIENT
Start: 2025-02-14 | End: 2025-03-16

## 2024-12-12 RX ORDER — TIRZEPATIDE 12.5 MG/.5ML
0.5 INJECTION, SOLUTION SUBCUTANEOUS
Qty: 2 ML | Refills: 11 | Status: SHIPPED | OUTPATIENT
Start: 2024-12-12

## 2024-12-12 RX ORDER — DEXTROAMPHETAMINE SACCHARATE, AMPHETAMINE ASPARTATE, DEXTROAMPHETAMINE SULFATE AND AMPHETAMINE SULFATE 5; 5; 5; 5 MG/1; MG/1; MG/1; MG/1
20 TABLET ORAL 2 TIMES DAILY
Qty: 60 TABLET | Refills: 0 | Status: SHIPPED | OUTPATIENT
Start: 2025-01-14 | End: 2025-02-13

## 2024-12-12 NOTE — ASSESSMENT & PLAN NOTE
No concerns for abuse and PDMP reviewed. Patient was having difficulties with filling at [facility] but that has been resolved.  Patient is not having any side effects from the medication.  She is taking vacations about once weekly which does align with her PDMP.  Patient is currently trying to lose weight and reports that some decreased appetite occurred when she started her Tirzepatide.     Discussed with patient to continue monitoring her appetite if it continues to decrease and she is losing weight quicker than she would prefer which her goal is about 2 pounds weekly on the medication that we can decrease her dose of Adderall.  Patient was agreeable.  Refill 3 months of her adderall and return to clinic in 3 months   Orders:    amphetamine-dextroamphetamine (ADDERALL) 20 MG Tab; Take 1 Tablet by mouth 2 times a day for 30 days.    amphetamine-dextroamphetamine (ADDERALL) 20 MG Tab; Take 1 Tablet by mouth 2 times a day for 30 days.    amphetamine-dextroamphetamine (ADDERALL) 20 MG Tab; Take 1 Tablet by mouth 2 times a day for 30 days.

## 2024-12-12 NOTE — ASSESSMENT & PLAN NOTE
Has been using tirzepatide now at dose of 12.5 mg weight loss.  She has lost 20 pounds since her last visit in August when she started the medication from a doctor in [place].  Patient is interested in filling her prescription locally to see if the cost will go down.  She does have a history of prediabetes with last A1c of 6.2 repeat A1c today was 5.6.    Orders:    POCT  A1C    Tirzepatide (MOUNJARO) 12.5 MG/0.5ML Solution Auto-injector; Inject 0.5 mL under the skin every 7 days.

## 2024-12-12 NOTE — PROGRESS NOTES
Subjective:     CC:   Chief Complaint   Patient presents with    Follow-Up        HPI:   Candy presents today for follow-up on her ADHD, refills and discuss medication loss. She is also due for a mammogram and flu shots.     She states that she has been taking Tirzepatide, from [employer] through a doctor in [place]. She is spending about $1000 a month. She has been monitoring her sugars at home which have been in the 80-100s. She has been taking it for 8/21/24. She is taking 12.5 mg weekly, she just increased her dose this week. Does have a history of papillary thyroid cancer and is interested in getting the prescription locally. She has been losing weight on the medication and is working on increasing her protein intake as well as exercising more. She has lost 20 lbs. She has discussed the medication with Dr. Cintron her endocrinologist that agreed with her to start the medication due to her weight gain despite thyroid being normal. Last A1C was 6.2% in 2022.       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 She takes it at  6 am and noon. She is taking vacations at least one day a week when not working. She denies symptoms of tachycardia, insomnia, constipation and headache. She is having decreased appetite but that has started when starting Tirzepatide. She is now eating smaller meals every 2-3 meals. No concerns for abuse. Reviewed PDMP. Consent for controlled substance was completed 5/22/2024.     She is interested in getting a mammogram and flu shot.       Current Outpatient Medications Ordered in Epic   Medication Sig Dispense Refill    [START ON 12/13/2024] amphetamine-dextroamphetamine (ADDERALL) 20 MG Tab Take 1 Tablet by mouth 2 times a day for 30 days. 60 Tablet 0    [START ON 1/14/2025] amphetamine-dextroamphetamine (ADDERALL) 20 MG Tab Take 1 Tablet by mouth 2 times a day for 30 days. 60 Tablet 0    [START ON 2/14/2025] amphetamine-dextroamphetamine  "(ADDERALL) 20 MG Tab Take 1 Tablet by mouth 2 times a day for 30 days. 60 Tablet 0    Tirzepatide (MOUNJARO) 12.5 MG/0.5ML Solution Auto-injector Inject 0.5 mL under the skin every 7 days. 2 mL 11    levothyroxine (SYNTHROID) 137 MCG Tab Take 137 mcg by mouth every morning on an empty stomach.      liothyronine (CYTOMEL) 5 MCG Tab Take 5 mcg by mouth every day. Take 7.5mcg daily      olopatadine (PATANOL) 0.1 % ophthalmic solution Administer 1 Drop into both eyes 2 times a day. For allergic conjunctivitis 5 mL 0     No current Epic-ordered facility-administered medications on file.     Family History   Problem Relation Age of Onset    ADD / ADHD Daughter       Past Surgical History:   Procedure Laterality Date    THYROIDECTOMY TOTAL  6/17/2015    Procedure: THYROIDECTOMY TOTAL;  Surgeon: Keshav Chahal M.D.;  Location: SURGERY SAME DAY [facility];  Service:     UMBILICAL HERNIA REPAIR  9/13/2012    Performed by MOO KEENAN at SURGERY SAME DAY [facility]    GYN SURGERY  2009    uterine ablation    OTHER  2007    breast augmentation    GYN SURGERY  2007    tubal      Social History     Tobacco Use    Smoking status: Never    Smokeless tobacco: Never   Vaping Use    Vaping status: Never Used   Substance Use Topics    Alcohol use: Yes     Comment:  1 per  week    Drug use: No        ROS:  Gen: no fevers/chills, no changes in weight  Pulm: no sob, no cough  CV: no chest pain, no palpitations  GI: no nausea/vomiting, no diarrhea    Objective:     Exam:  /76 (BP Location: Left arm, Patient Position: Sitting, BP Cuff Size: Adult)   Pulse 86   Resp 16   Ht 1.651 m (5' 5\")   Wt 89.8 kg (198 lb)   SpO2 96%   BMI 32.95 kg/m²  Body mass index is 32.95 kg/m².    Gen: Alert and oriented, No apparent distress.  Neck: Neck is supple without lymphadenopathy.  Lungs: Normal effort, CTA bilaterally, no wheezes, rhonchi, or rales  CV: Regular rate and rhythm. No murmurs, rubs, or gallops.  Ext: No clubbing, " cyanosis, edema.    Labs:   Results for orders placed or performed in visit on 12/12/24   POCT  A1C    Collection Time: 12/12/24  8:55 AM   Result Value Ref Range    Glycohemoglobin 5.6 <=5.8 %    Internal Control Positive Positive     Internal Control Negative Negative          Assessment & Plan:     59 y.o. female with the following -     Assessment & Plan  Attention deficit disorder of adult with hyperactivity  No concerns for abuse and PDMP reviewed. Patient was having difficulties with filling at [facility] but that has been resolved.  Patient is not having any side effects from the medication.  She is taking vacations about once weekly which does align with her PDMP.  Patient is currently trying to lose weight and reports that some decreased appetite occurred when she started her Tirzepatide.     Discussed with patient to continue monitoring her appetite if it continues to decrease and she is losing weight quicker than she would prefer which her goal is about 2 pounds weekly on the medication that we can decrease her dose of Adderall.  Patient was agreeable.  Refill 3 months of her adderall and return to clinic in 3 months   Orders:    amphetamine-dextroamphetamine (ADDERALL) 20 MG Tab; Take 1 Tablet by mouth 2 times a day for 30 days.    amphetamine-dextroamphetamine (ADDERALL) 20 MG Tab; Take 1 Tablet by mouth 2 times a day for 30 days.    amphetamine-dextroamphetamine (ADDERALL) 20 MG Tab; Take 1 Tablet by mouth 2 times a day for 30 days.    Obesity (BMI 30-39.9)  Has been using tirzepatide now at dose of 12.5 mg weight loss.  She has lost 20 pounds since her last visit in August when she started the medication from a doctor in [place].  Patient is interested in filling her prescription locally to see if the cost will go down.  She does have a history of prediabetes with last A1c of 6.2 repeat A1c today was 5.6.    Orders:    POCT  A1C    Tirzepatide (MOUNJARO) 12.5 MG/0.5ML Solution Auto-injector; Inject  0.5 mL under the skin every 7 days.    Pre-diabetes  Repeat A1c today 5.6 previous A1c was 6.2.  Has been monitoring her sugars while in terms appetite and has not had any hypoglycemic events.    Orders:    POCT  A1C    Tirzepatide (MOUNJARO) 12.5 MG/0.5ML Solution Auto-injector; Inject 0.5 mL under the skin every 7 days.    Need for vaccination    Orders:    INFLUENZA VACCINE TRI INJ (PF)     Encounter for screening mammogram for breast cancer    Orders:    MA-SCREENING MAMMO BILAT W/TOMOSYNTHESIS W/CAD; Future          Return in about 3 months (around 3/12/2025).    Hilary Velasco M.D.  PGY2

## 2024-12-12 NOTE — ASSESSMENT & PLAN NOTE
Repeat A1c today 5.6 previous A1c was 6.2.  Has been monitoring her sugars while in terms appetite and has not had any hypoglycemic events.    Orders:    POCT  A1C    Tirzepatide (MOUNJARO) 12.5 MG/0.5ML Solution Auto-injector; Inject 0.5 mL under the skin every 7 days.

## 2025-07-22 ENCOUNTER — OFFICE VISIT (OUTPATIENT)
Dept: MEDICAL GROUP | Facility: CLINIC | Age: 60
End: 2025-07-22
Payer: COMMERCIAL

## 2025-07-22 VITALS
HEIGHT: 65 IN | WEIGHT: 173.9 LBS | SYSTOLIC BLOOD PRESSURE: 108 MMHG | DIASTOLIC BLOOD PRESSURE: 74 MMHG | TEMPERATURE: 98.4 F | HEART RATE: 74 BPM | BODY MASS INDEX: 28.97 KG/M2 | OXYGEN SATURATION: 99 %

## 2025-07-22 DIAGNOSIS — Z23 NEED FOR VACCINATION: ICD-10-CM

## 2025-07-22 DIAGNOSIS — F90.9 ATTENTION DEFICIT DISORDER OF ADULT WITH HYPERACTIVITY: Primary | ICD-10-CM

## 2025-07-22 PROCEDURE — 3074F SYST BP LT 130 MM HG: CPT | Mod: GC

## 2025-07-22 PROCEDURE — 99213 OFFICE O/P EST LOW 20 MIN: CPT | Mod: 25,GE

## 2025-07-22 PROCEDURE — 90677 PCV20 VACCINE IM: CPT | Performed by: FAMILY MEDICINE

## 2025-07-22 PROCEDURE — 90471 IMMUNIZATION ADMIN: CPT | Performed by: FAMILY MEDICINE

## 2025-07-22 PROCEDURE — 3078F DIAST BP <80 MM HG: CPT | Mod: GC

## 2025-07-22 RX ORDER — DEXTROAMPHETAMINE SACCHARATE, AMPHETAMINE ASPARTATE, DEXTROAMPHETAMINE SULFATE AND AMPHETAMINE SULFATE 5; 5; 5; 5 MG/1; MG/1; MG/1; MG/1
20 TABLET ORAL 2 TIMES DAILY
Qty: 60 TABLET | Refills: 0 | Status: SHIPPED | OUTPATIENT
Start: 2025-09-22 | End: 2025-10-22

## 2025-07-22 RX ORDER — DEXTROAMPHETAMINE SACCHARATE, AMPHETAMINE ASPARTATE, DEXTROAMPHETAMINE SULFATE AND AMPHETAMINE SULFATE 5; 5; 5; 5 MG/1; MG/1; MG/1; MG/1
20 TABLET ORAL 2 TIMES DAILY
Qty: 60 TABLET | Refills: 0 | Status: SHIPPED | OUTPATIENT
Start: 2025-07-22 | End: 2025-08-21

## 2025-07-22 RX ORDER — DEXTROAMPHETAMINE SACCHARATE, AMPHETAMINE ASPARTATE, DEXTROAMPHETAMINE SULFATE AND AMPHETAMINE SULFATE 5; 5; 5; 5 MG/1; MG/1; MG/1; MG/1
20 TABLET ORAL 2 TIMES DAILY
Qty: 60 TABLET | Refills: 0 | Status: SHIPPED | OUTPATIENT
Start: 2025-08-22 | End: 2025-09-21

## 2025-07-22 ASSESSMENT — PATIENT HEALTH QUESTIONNAIRE - PHQ9: CLINICAL INTERPRETATION OF PHQ2 SCORE: 0

## 2025-07-22 NOTE — PROGRESS NOTES
"Subjective:     CC:   Chief Complaint   Patient presents with    Medication Refill     adderall        HPI:   Candy presents today for refills on her adderall. Patient reports that she is taking it twice daily for while at work. She is taking vacations at least 2 days a week. She denies palpitations, tachycardia, insomnia, constipation and headache. She reports improved appetite. She is taking tirzepatide.  Reviewed PDMP. Consent for controlled substance completed today.  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     She is interested in the pneumococcal vaccine.         Past Medical History[1]  Current Medications and Prescriptions Ordered in Epic[2]  Family History   Problem Relation Age of Onset    ADD / ADHD Daughter       Past Surgical History[3]   Social History[4]     ROS:  Gen: no fevers/chills, no changes in weight  Pulm: no sob, no cough  CV: no chest pain, no palpitations  GI: no nausea/vomiting, no diarrhea    Objective:     Exam:  /74 (BP Location: Right arm, Patient Position: Sitting, BP Cuff Size: Adult)   Pulse 74   Temp 36.9 °C (98.4 °F) (Temporal)   Ht 1.651 m (5' 5\")   Wt 78.9 kg (173 lb 14.4 oz)   SpO2 99%   BMI 28.94 kg/m²  Body mass index is 28.94 kg/m².    Gen: Alert and oriented, No apparent distress.  Neck: Neck is supple without lymphadenopathy.  Lungs: Normal effort, CTA bilaterally, no wheezes, rhonchi, or rales  CV: Regular rate and rhythm. No murmurs, rubs, or gallops.  Ext: No clubbing, cyanosis, edema.        Assessment & Plan:     59 y.o. female with the following -     Assessment & Plan  Attention deficit disorder of adult with hyperactivity  No concerns for abuse. She needs the medication mainly to focus for work and for some mental activities at home. Completed controlled substance agreement today. Will send 3 months of refills. Plan to follow-up in 3 months.   Orders:    amphetamine-dextroamphetamine (ADDERALL) 20 MG Tab; Take 1 Tablet by mouth 2 times a " day for 30 days.    amphetamine-dextroamphetamine (ADDERALL) 20 MG Tab; Take 1 Tablet by mouth 2 times a day for 30 days.    amphetamine-dextroamphetamine (ADDERALL) 20 MG Tab; Take 1 Tablet by mouth 2 times a day for 30 days.    Controlled Substance Treatment Agreement    Need for vaccination    Orders:    Pneumococcal Conjugate Vaccine 20-Valent (6 wks+)         Return in about 3 months (around 10/22/2025).    Hilary Velasco M.D.  PGY3               [1]   Past Medical History:  Diagnosis Date    Cold     06/4/15    Psychiatric problem     anxiety    Unspecified disorder of thyroid    [2]   Current Outpatient Medications Ordered in Epic   Medication Sig Dispense Refill    amphetamine-dextroamphetamine (ADDERALL) 20 MG Tab Take 1 Tablet by mouth 2 times a day for 30 days. 60 Tablet 0    [START ON 8/22/2025] amphetamine-dextroamphetamine (ADDERALL) 20 MG Tab Take 1 Tablet by mouth 2 times a day for 30 days. 60 Tablet 0    [START ON 9/22/2025] amphetamine-dextroamphetamine (ADDERALL) 20 MG Tab Take 1 Tablet by mouth 2 times a day for 30 days. 60 Tablet 0    Tirzepatide (MOUNJARO) 12.5 MG/0.5ML Solution Auto-injector Inject 0.5 mL under the skin every 7 days. 2 mL 11    levothyroxine (SYNTHROID) 137 MCG Tab Take 137 mcg by mouth every morning on an empty stomach.      liothyronine (CYTOMEL) 5 MCG Tab Take 5 mcg by mouth every day. Take 7.5mcg daily      olopatadine (PATANOL) 0.1 % ophthalmic solution Administer 1 Drop into both eyes 2 times a day. For allergic conjunctivitis 5 mL 0     No current Epic-ordered facility-administered medications on file.   [3]   Past Surgical History:  Procedure Laterality Date    THYROIDECTOMY TOTAL  6/17/2015    Procedure: THYROIDECTOMY TOTAL;  Surgeon: Keshav Chahal M.D.;  Location: SURGERY SAME DAY [facility];  Service:     UMBILICAL HERNIA REPAIR  9/13/2012    Performed by MOO KEENAN at SURGERY SAME DAY [facility]    GYN SURGERY  2009    uterine ablation    OTHER   2007    breast augmentation    GYN SURGERY  2007    tubal   [4]   Social History  Tobacco Use    Smoking status: Never    Smokeless tobacco: Never   Vaping Use    Vaping status: Never Used   Substance Use Topics    Alcohol use: Yes     Comment:  1 per  week    Drug use: No

## 2025-07-22 NOTE — ASSESSMENT & PLAN NOTE
No concerns for abuse. She needs the medication mainly to focus for work and for some mental activities at home. Completed controlled substance agreement today. Will send 3 months of refills. Plan to follow-up in 3 months.   Orders:    amphetamine-dextroamphetamine (ADDERALL) 20 MG Tab; Take 1 Tablet by mouth 2 times a day for 30 days.    amphetamine-dextroamphetamine (ADDERALL) 20 MG Tab; Take 1 Tablet by mouth 2 times a day for 30 days.    amphetamine-dextroamphetamine (ADDERALL) 20 MG Tab; Take 1 Tablet by mouth 2 times a day for 30 days.    Controlled Substance Treatment Agreement

## 2025-08-28 DIAGNOSIS — F90.9 ATTENTION DEFICIT DISORDER OF ADULT WITH HYPERACTIVITY: ICD-10-CM

## 2025-08-28 RX ORDER — DEXTROAMPHETAMINE SACCHARATE, AMPHETAMINE ASPARTATE, DEXTROAMPHETAMINE SULFATE AND AMPHETAMINE SULFATE 2.5; 2.5; 2.5; 2.5 MG/1; MG/1; MG/1; MG/1
20 TABLET ORAL 2 TIMES DAILY
Qty: 120 TABLET | Refills: 0 | Status: SHIPPED | OUTPATIENT
Start: 2025-08-28 | End: 2025-09-27